# Patient Record
Sex: MALE | Race: WHITE | NOT HISPANIC OR LATINO
[De-identification: names, ages, dates, MRNs, and addresses within clinical notes are randomized per-mention and may not be internally consistent; named-entity substitution may affect disease eponyms.]

---

## 2017-09-11 ENCOUNTER — MEDICATION RENEWAL (OUTPATIENT)
Age: 57
End: 2017-09-11

## 2017-10-16 ENCOUNTER — RX RENEWAL (OUTPATIENT)
Age: 57
End: 2017-10-16

## 2017-11-13 ENCOUNTER — RX RENEWAL (OUTPATIENT)
Age: 57
End: 2017-11-13

## 2017-11-28 ENCOUNTER — APPOINTMENT (OUTPATIENT)
Dept: UROLOGY | Facility: CLINIC | Age: 57
End: 2017-11-28
Payer: COMMERCIAL

## 2017-11-28 VITALS
SYSTOLIC BLOOD PRESSURE: 121 MMHG | HEART RATE: 71 BPM | WEIGHT: 175 LBS | HEIGHT: 69 IN | TEMPERATURE: 98.9 F | BODY MASS INDEX: 25.92 KG/M2 | DIASTOLIC BLOOD PRESSURE: 79 MMHG

## 2017-11-28 DIAGNOSIS — R39.11 HESITANCY OF MICTURITION: ICD-10-CM

## 2017-11-28 PROCEDURE — 99214 OFFICE O/P EST MOD 30 MIN: CPT

## 2017-12-02 LAB — PSA SERPL-MCNC: 3.99 NG/ML

## 2017-12-11 ENCOUNTER — RX RENEWAL (OUTPATIENT)
Age: 57
End: 2017-12-11

## 2017-12-12 ENCOUNTER — RX RENEWAL (OUTPATIENT)
Age: 57
End: 2017-12-12

## 2018-03-23 ENCOUNTER — RX RENEWAL (OUTPATIENT)
Age: 58
End: 2018-03-23

## 2019-03-10 ENCOUNTER — RX RENEWAL (OUTPATIENT)
Age: 59
End: 2019-03-10

## 2019-03-21 ENCOUNTER — APPOINTMENT (OUTPATIENT)
Dept: UROLOGY | Facility: CLINIC | Age: 59
End: 2019-03-21
Payer: COMMERCIAL

## 2019-03-21 VITALS
WEIGHT: 175 LBS | HEIGHT: 69 IN | BODY MASS INDEX: 25.92 KG/M2 | TEMPERATURE: 98.2 F | SYSTOLIC BLOOD PRESSURE: 118 MMHG | HEART RATE: 79 BPM | DIASTOLIC BLOOD PRESSURE: 80 MMHG | RESPIRATION RATE: 17 BRPM

## 2019-03-21 DIAGNOSIS — R35.0 FREQUENCY OF MICTURITION: ICD-10-CM

## 2019-03-21 PROCEDURE — 99214 OFFICE O/P EST MOD 30 MIN: CPT

## 2019-03-23 LAB
ANION GAP SERPL CALC-SCNC: 16 MMOL/L
BUN SERPL-MCNC: 21 MG/DL
CALCIUM SERPL-MCNC: 9.8 MG/DL
CHLORIDE SERPL-SCNC: 103 MMOL/L
CO2 SERPL-SCNC: 24 MMOL/L
CREAT SERPL-MCNC: 1.02 MG/DL
GLUCOSE SERPL-MCNC: 104 MG/DL
POTASSIUM SERPL-SCNC: 4.2 MMOL/L
PSA FREE FLD-MCNC: 25 %
PSA FREE SERPL-MCNC: 0.97 NG/ML
PSA SERPL-MCNC: 3.87 NG/ML
SODIUM SERPL-SCNC: 143 MMOL/L

## 2019-03-23 NOTE — HISTORY OF PRESENT ILLNESS
[FreeTextEntry1] : Viet Calderón returns to the office today. He is a 59-year-old man with a saw in November 2017. In the past he has undergone prostate biopsy related to PSA elevation and this returned all benign prostate tissue. He also had a prior MRI of his prostate in 2014 showing no evidence of any suspicious lesions within the prostate. He has baseline lower urinary tract symptoms as well related to BPH and bladder outlet obstruction. He continues to use tamsulosin 0.8 mg daily. He says that the significance of the urinary symptoms does wax and wane a bit but for the most part he seems to be fairly comfortable and has not had any major progression of the symptoms over the last 1-1/2 years.\par \par He denies any gross hematuria or dysuria. He continues to notice that the stream seems to be a bit weak or stopping and starting at times. He also has mild urinary hesitancy. His nocturia is typically 2-4 times overnight, but mostly toward twice per night. He has not had any problems of urinary retention or urinary tract infections.\par \par The patient does have a family history of prostate cancer with his father being affected with the disease.\par

## 2019-03-23 NOTE — ASSESSMENT
[FreeTextEntry1] : Prostate examination continues to show smooth symmetric prostate without any evidence of focal nodular aorta induration to suggest presence of malignancy. His PSA was rechecked today and found to be 3.87 ng/mL. This is stable compared to last year when it was 3.99 ng/mL at that time. I do not think he needs to consider any additional biopsies of the prostate for now his PSA should continue to be followed about once a year.\par \par The other laboratory evaluation I assessed today was his basic metabolic panel. His electrolytes seem to be normal and his creatinine level is 1.02 mg/dL showing normal renal function.\par \par His tamsulosin prescription was renewed and he will continue this medication for the indefinite future. I will see him in one year.\par

## 2020-12-07 ENCOUNTER — RX RENEWAL (OUTPATIENT)
Age: 60
End: 2020-12-07

## 2020-12-23 ENCOUNTER — APPOINTMENT (OUTPATIENT)
Dept: UROLOGY | Facility: CLINIC | Age: 60
End: 2020-12-23
Payer: COMMERCIAL

## 2020-12-23 VITALS
TEMPERATURE: 97.8 F | HEIGHT: 69 IN | WEIGHT: 178 LBS | HEART RATE: 84 BPM | SYSTOLIC BLOOD PRESSURE: 114 MMHG | DIASTOLIC BLOOD PRESSURE: 68 MMHG | BODY MASS INDEX: 26.36 KG/M2

## 2020-12-23 PROCEDURE — 99072 ADDL SUPL MATRL&STAF TM PHE: CPT

## 2020-12-23 PROCEDURE — 99214 OFFICE O/P EST MOD 30 MIN: CPT

## 2020-12-29 ENCOUNTER — NON-APPOINTMENT (OUTPATIENT)
Age: 60
End: 2020-12-29

## 2020-12-29 LAB
ANION GAP SERPL CALC-SCNC: 10 MMOL/L
BASOPHILS # BLD AUTO: 0.03 K/UL
BASOPHILS NFR BLD AUTO: 0.3 %
BUN SERPL-MCNC: 20 MG/DL
CALCIUM SERPL-MCNC: 9.8 MG/DL
CHLORIDE SERPL-SCNC: 103 MMOL/L
CO2 SERPL-SCNC: 27 MMOL/L
CREAT SERPL-MCNC: 1.08 MG/DL
EOSINOPHIL # BLD AUTO: 0.42 K/UL
EOSINOPHIL NFR BLD AUTO: 4.4 %
GLUCOSE SERPL-MCNC: 127 MG/DL
HCT VFR BLD CALC: 43.6 %
HGB BLD-MCNC: 14 G/DL
IMM GRANULOCYTES NFR BLD AUTO: 0.5 %
LYMPHOCYTES # BLD AUTO: 2.79 K/UL
LYMPHOCYTES NFR BLD AUTO: 29.5 %
MAN DIFF?: NORMAL
MCHC RBC-ENTMCNC: 31.7 PG
MCHC RBC-ENTMCNC: 32.1 GM/DL
MCV RBC AUTO: 98.6 FL
MONOCYTES # BLD AUTO: 0.68 K/UL
MONOCYTES NFR BLD AUTO: 7.2 %
NEUTROPHILS # BLD AUTO: 5.49 K/UL
NEUTROPHILS NFR BLD AUTO: 58.1 %
PLATELET # BLD AUTO: 266 K/UL
POTASSIUM SERPL-SCNC: 4.6 MMOL/L
PSA FREE FLD-MCNC: 26 %
PSA FREE SERPL-MCNC: 1.13 NG/ML
PSA SERPL-MCNC: 4.33 NG/ML
RBC # BLD: 4.42 M/UL
RBC # FLD: 12.6 %
SODIUM SERPL-SCNC: 140 MMOL/L
WBC # FLD AUTO: 9.46 K/UL

## 2020-12-29 NOTE — HISTORY OF PRESENT ILLNESS
[FreeTextEntry1] : Viet Calderón returns to the office today.  He is a 60-year-old man with history of obstructive lower urinary tract symptoms.  He has had intermittency of the urinary stream, some hesitancy, and urinary frequency.  He has been managed with tamsulosin monotherapy since around 2012 with some symptomatic improvement although he now does experience some worsening of the symptoms.  He denies any gross hematuria or dysuria.  He has not been in urinary retention or required catheterization of the bladder.  He denies any flank pain, fever, chill, nausea or vomiting.  He has no other abdominal complaints and denies constipation.\par \par The patient does not have a current primary care physician.  He has not been to any other physician outside of myself by his report over the last few years.  I last saw him in March 2019.  I have followed his PSA over the years and his PSA has been slowly increasing, most likely related to his enlarged prostate.  The patient did have a prostate biopsy performed in 2015 when his PSA had been noted to be elevated.  He also had a prostate MRI at that time.  The prostate MRI did not show any significant lesions.  PI-RADS 2.  His prostate measured 58 cm³ at that time.  His biopsy was all negative.\par \par

## 2020-12-29 NOTE — ASSESSMENT
[FreeTextEntry1] : His PSA was reevaluated today and is now 4.33 ng/mL with 26% free fraction.  This compares with 3.87 from March 2019.  At this point, I do suspect that this PSA increase is again related to his benign prostate enlargement.  His PSA has not been trending up significantly over the last several years.  I do not think he needs to have any additional imaging or biopsy of the prostate at this point.\par \par I would recommend he check his PSA again in about 6 months.  We have decided today to initiate finasteride related to his worsening urinary symptoms and from a symptomatic standpoint and also to help prevent a potential future issue of either urinary retention or requirement of bladder outlet surgery, finasteride was added for these purposes.  His PSA is expected to decline also on finasteride and we will review the PSA in 6 months for that purpose and also review his symptoms again to ensure he is having adequate treatment response.\par \par I also advised him that his blood glucose level on basic metabolic panel collected today is a bit elevated and I would suggest that he obtain follow-up with a primary care physician for additional management.

## 2021-12-28 ENCOUNTER — RX RENEWAL (OUTPATIENT)
Age: 61
End: 2021-12-28

## 2022-01-04 ENCOUNTER — RX RENEWAL (OUTPATIENT)
Age: 62
End: 2022-01-04

## 2022-01-28 ENCOUNTER — RX RENEWAL (OUTPATIENT)
Age: 62
End: 2022-01-28

## 2022-02-07 ENCOUNTER — APPOINTMENT (OUTPATIENT)
Dept: UROLOGY | Facility: CLINIC | Age: 62
End: 2022-02-07
Payer: COMMERCIAL

## 2022-02-07 VITALS
HEIGHT: 69 IN | RESPIRATION RATE: 17 BRPM | DIASTOLIC BLOOD PRESSURE: 77 MMHG | BODY MASS INDEX: 26.36 KG/M2 | SYSTOLIC BLOOD PRESSURE: 155 MMHG | WEIGHT: 178 LBS | HEART RATE: 76 BPM

## 2022-02-07 DIAGNOSIS — B02.23 POSTHERPETIC POLYNEUROPATHY: ICD-10-CM

## 2022-02-07 PROCEDURE — 99214 OFFICE O/P EST MOD 30 MIN: CPT | Mod: 25

## 2022-02-07 PROCEDURE — 51798 US URINE CAPACITY MEASURE: CPT

## 2022-02-07 RX ORDER — FINASTERIDE 5 MG/1
5 TABLET, FILM COATED ORAL
Qty: 90 | Refills: 3 | Status: COMPLETED | COMMUNITY
Start: 2020-12-23 | End: 2022-02-07

## 2022-02-07 RX ORDER — GABAPENTIN 300 MG/1
300 CAPSULE ORAL
Qty: 60 | Refills: 0 | Status: DISCONTINUED | COMMUNITY
Start: 2021-08-16

## 2022-02-07 RX ORDER — FAMCICLOVIR 500 MG/1
500 TABLET, FILM COATED ORAL
Qty: 20 | Refills: 0 | Status: DISCONTINUED | COMMUNITY
Start: 2021-08-13

## 2022-02-07 NOTE — HISTORY OF PRESENT ILLNESS
[FreeTextEntry1] : Viet Calderón returns to the office today.  He is a 61-year-old male with history of BPH and lower urinary tract symptoms.  He is on tamsulosin 0.4 mg which he takes twice daily.  He notes that he is quite dependent on this and if he misses a dose he has difficulty urinating and has been in near retention at times.  I have previously prescribed him finasteride to use for additional prophylaxis against retention but he did not pick it up in the past and decided not to use it.  He notes that his stream seems to be reasonable when he is on medication.  He has notable frequency and occasional urgency but no incontinence.  He has nocturia x1.  He denies hematuria or dysuria and there have been no additional episodes of retention or infection.\par

## 2022-02-07 NOTE — ASSESSMENT
[FreeTextEntry1] : We reviewed management of his BPH.  His residual bladder scan today shows he is retaining urine, 220 mL.  I would advise him again that finasteride here may be of help to him and preventing episodes of either retention or requiring surgery for relief of bladder outlet obstruction.  I explained to him how the combination therapy is with alpha blockers and 5 alpha reductase inhibitors as evidence behind using them for preventative purposes.  I gave him a new prescription for finasteride and he states that he will take it with this in mind.  I did review proper use and possible side effects that may be experienced.\par \par His PSA will be rechecked today as part of ongoing prostate cancer screening.  I will be in touch with him with that result once available for review and if there is any need to consider additional work-up for PSA elevation, we may consider prostate imaging with MRI.

## 2022-02-12 LAB
PSA FREE FLD-MCNC: 24 %
PSA FREE SERPL-MCNC: 1.24 NG/ML
PSA SERPL-MCNC: 5.27 NG/ML

## 2022-02-26 ENCOUNTER — EMERGENCY (EMERGENCY)
Facility: HOSPITAL | Age: 62
LOS: 0 days | Discharge: HOME | End: 2022-02-26
Attending: STUDENT IN AN ORGANIZED HEALTH CARE EDUCATION/TRAINING PROGRAM
Payer: COMMERCIAL

## 2022-02-26 ENCOUNTER — LABORATORY RESULT (OUTPATIENT)
Age: 62
End: 2022-02-26

## 2022-02-26 VITALS
WEIGHT: 169.98 LBS | DIASTOLIC BLOOD PRESSURE: 63 MMHG | HEART RATE: 65 BPM | RESPIRATION RATE: 18 BRPM | SYSTOLIC BLOOD PRESSURE: 104 MMHG

## 2022-02-26 VITALS
TEMPERATURE: 99 F | OXYGEN SATURATION: 100 % | HEART RATE: 57 BPM | SYSTOLIC BLOOD PRESSURE: 107 MMHG | DIASTOLIC BLOOD PRESSURE: 65 MMHG | RESPIRATION RATE: 15 BRPM
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
## 2022-02-26 DIAGNOSIS — R07.81 PLEURODYNIA: ICD-10-CM

## 2022-02-26 DIAGNOSIS — S00.01XA ABRASION OF SCALP, INITIAL ENCOUNTER: ICD-10-CM

## 2022-02-26 DIAGNOSIS — S09.90XA UNSPECIFIED INJURY OF HEAD, INITIAL ENCOUNTER: ICD-10-CM

## 2022-02-26 DIAGNOSIS — R55 SYNCOPE AND COLLAPSE: ICD-10-CM

## 2022-02-26 DIAGNOSIS — Z23 ENCOUNTER FOR IMMUNIZATION: ICD-10-CM
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
## 2022-02-26 DIAGNOSIS — Z87.438 PERSONAL HISTORY OF OTHER DISEASES OF MALE GENITAL ORGANS: ICD-10-CM

## 2022-02-26 LAB
ALBUMIN SERPL ELPH-MCNC: 3.1 G/DL — LOW (ref 3.3–5)
ALP SERPL-CCNC: 86 U/L — SIGNIFICANT CHANGE UP (ref 40–120)
ALT FLD-CCNC: 16 U/L — SIGNIFICANT CHANGE UP (ref 12–78)
ANION GAP SERPL CALC-SCNC: 6 MMOL/L — SIGNIFICANT CHANGE UP (ref 5–17)
APPEARANCE UR: CLEAR — SIGNIFICANT CHANGE UP
APTT BLD: 26.5 SEC — LOW (ref 27.5–35.5)
AST SERPL-CCNC: 10 U/L — LOW (ref 15–37)
BASOPHILS # BLD AUTO: 0.03 K/UL — SIGNIFICANT CHANGE UP (ref 0–0.2)
BASOPHILS NFR BLD AUTO: 0.3 % — SIGNIFICANT CHANGE UP (ref 0–2)
BILIRUB SERPL-MCNC: 0.6 MG/DL — SIGNIFICANT CHANGE UP (ref 0.2–1.2)
BILIRUB UR-MCNC: NEGATIVE — SIGNIFICANT CHANGE UP
BUN SERPL-MCNC: 17 MG/DL — SIGNIFICANT CHANGE UP (ref 7–23)
CALCIUM SERPL-MCNC: 8.4 MG/DL — LOW (ref 8.5–10.1)
CHLORIDE SERPL-SCNC: 108 MMOL/L — SIGNIFICANT CHANGE UP (ref 96–108)
CK SERPL-CCNC: 103 U/L — SIGNIFICANT CHANGE UP (ref 26–308)
CO2 SERPL-SCNC: 25 MMOL/L — SIGNIFICANT CHANGE UP (ref 22–31)
COLOR SPEC: YELLOW — SIGNIFICANT CHANGE UP
CREAT SERPL-MCNC: 1 MG/DL — SIGNIFICANT CHANGE UP (ref 0.5–1.3)
DIFF PNL FLD: ABNORMAL
EOSINOPHIL # BLD AUTO: 0.42 K/UL — SIGNIFICANT CHANGE UP (ref 0–0.5)
EOSINOPHIL NFR BLD AUTO: 4 % — SIGNIFICANT CHANGE UP (ref 0–6)
GLUCOSE SERPL-MCNC: 144 MG/DL — HIGH (ref 70–99)
GLUCOSE UR QL: NEGATIVE — SIGNIFICANT CHANGE UP
HCT VFR BLD CALC: 38.6 % — LOW (ref 39–50)
HGB BLD-MCNC: 12.9 G/DL — LOW (ref 13–17)
IMM GRANULOCYTES NFR BLD AUTO: 0.4 % — SIGNIFICANT CHANGE UP (ref 0–1.5)
INR BLD: 0.97 RATIO — SIGNIFICANT CHANGE UP (ref 0.88–1.16)
KETONES UR-MCNC: NEGATIVE — SIGNIFICANT CHANGE UP
LEUKOCYTE ESTERASE UR-ACNC: NEGATIVE — SIGNIFICANT CHANGE UP
LYMPHOCYTES # BLD AUTO: 2.26 K/UL — SIGNIFICANT CHANGE UP (ref 1–3.3)
LYMPHOCYTES # BLD AUTO: 21.7 % — SIGNIFICANT CHANGE UP (ref 13–44)
MAGNESIUM SERPL-MCNC: 2.2 MG/DL — SIGNIFICANT CHANGE UP (ref 1.6–2.6)
MCHC RBC-ENTMCNC: 32.4 PG — SIGNIFICANT CHANGE UP (ref 27–34)
MCHC RBC-ENTMCNC: 33.4 GM/DL — SIGNIFICANT CHANGE UP (ref 32–36)
MCV RBC AUTO: 97 FL — SIGNIFICANT CHANGE UP (ref 80–100)
MONOCYTES # BLD AUTO: 0.71 K/UL — SIGNIFICANT CHANGE UP (ref 0–0.9)
MONOCYTES NFR BLD AUTO: 6.8 % — SIGNIFICANT CHANGE UP (ref 2–14)
NEUTROPHILS # BLD AUTO: 6.95 K/UL — SIGNIFICANT CHANGE UP (ref 1.8–7.4)
NEUTROPHILS NFR BLD AUTO: 66.8 % — SIGNIFICANT CHANGE UP (ref 43–77)
NITRITE UR-MCNC: NEGATIVE — SIGNIFICANT CHANGE UP
PH UR: 5 — SIGNIFICANT CHANGE UP (ref 5–8)
PLATELET # BLD AUTO: 252 K/UL — SIGNIFICANT CHANGE UP (ref 150–400)
POTASSIUM SERPL-MCNC: 4 MMOL/L — SIGNIFICANT CHANGE UP (ref 3.5–5.3)
POTASSIUM SERPL-SCNC: 4 MMOL/L — SIGNIFICANT CHANGE UP (ref 3.5–5.3)
PROT SERPL-MCNC: 6.4 GM/DL — SIGNIFICANT CHANGE UP (ref 6–8.3)
PROT UR-MCNC: NEGATIVE — SIGNIFICANT CHANGE UP
PROTHROM AB SERPL-ACNC: 11.3 SEC — SIGNIFICANT CHANGE UP (ref 10.5–13.4)
RBC # BLD: 3.98 M/UL — LOW (ref 4.2–5.8)
RBC # FLD: 12.7 % — SIGNIFICANT CHANGE UP (ref 10.3–14.5)
SODIUM SERPL-SCNC: 139 MMOL/L — SIGNIFICANT CHANGE UP (ref 135–145)
SP GR SPEC: 1 — LOW (ref 1.01–1.02)
TROPONIN I, HIGH SENSITIVITY RESULT: 5.91 NG/L — SIGNIFICANT CHANGE UP
TROPONIN I, HIGH SENSITIVITY RESULT: 5.97 NG/L — SIGNIFICANT CHANGE UP
TSH SERPL-MCNC: 2.03 UU/ML — SIGNIFICANT CHANGE UP (ref 0.34–4.82)
UROBILINOGEN FLD QL: NEGATIVE — SIGNIFICANT CHANGE UP
WBC # BLD: 10.41 K/UL — SIGNIFICANT CHANGE UP (ref 3.8–10.5)
WBC # FLD AUTO: 10.41 K/UL — SIGNIFICANT CHANGE UP (ref 3.8–10.5)

## 2022-02-26 PROCEDURE — 72125 CT NECK SPINE W/O DYE: CPT | Mod: 26,MA

## 2022-02-26 PROCEDURE — 71260 CT THORAX DX C+: CPT | Mod: MA

## 2022-02-26 PROCEDURE — 36415 COLL VENOUS BLD VENIPUNCTURE: CPT

## 2022-02-26 PROCEDURE — 84484 ASSAY OF TROPONIN QUANT: CPT

## 2022-02-26 PROCEDURE — 80053 COMPREHEN METABOLIC PANEL: CPT

## 2022-02-26 PROCEDURE — 85730 THROMBOPLASTIN TIME PARTIAL: CPT

## 2022-02-26 PROCEDURE — 99285 EMERGENCY DEPT VISIT HI MDM: CPT

## 2022-02-26 PROCEDURE — 87086 URINE CULTURE/COLONY COUNT: CPT

## 2022-02-26 PROCEDURE — 99285 EMERGENCY DEPT VISIT HI MDM: CPT | Mod: 25

## 2022-02-26 PROCEDURE — 82550 ASSAY OF CK (CPK): CPT

## 2022-02-26 PROCEDURE — 72125 CT NECK SPINE W/O DYE: CPT | Mod: MA

## 2022-02-26 PROCEDURE — 90715 TDAP VACCINE 7 YRS/> IM: CPT

## 2022-02-26 PROCEDURE — 90471 IMMUNIZATION ADMIN: CPT

## 2022-02-26 PROCEDURE — 86900 BLOOD TYPING SEROLOGIC ABO: CPT

## 2022-02-26 PROCEDURE — 85025 COMPLETE CBC W/AUTO DIFF WBC: CPT

## 2022-02-26 PROCEDURE — 86850 RBC ANTIBODY SCREEN: CPT

## 2022-02-26 PROCEDURE — 81001 URINALYSIS AUTO W/SCOPE: CPT

## 2022-02-26 PROCEDURE — 83735 ASSAY OF MAGNESIUM: CPT

## 2022-02-26 PROCEDURE — 84443 ASSAY THYROID STIM HORMONE: CPT

## 2022-02-26 PROCEDURE — 82962 GLUCOSE BLOOD TEST: CPT

## 2022-02-26 PROCEDURE — 70450 CT HEAD/BRAIN W/O DYE: CPT | Mod: 26,MA

## 2022-02-26 PROCEDURE — 74177 CT ABD & PELVIS W/CONTRAST: CPT | Mod: 26,MA

## 2022-02-26 PROCEDURE — 70450 CT HEAD/BRAIN W/O DYE: CPT | Mod: MA

## 2022-02-26 PROCEDURE — 86901 BLOOD TYPING SEROLOGIC RH(D): CPT

## 2022-02-26 PROCEDURE — 85610 PROTHROMBIN TIME: CPT

## 2022-02-26 PROCEDURE — 93010 ELECTROCARDIOGRAM REPORT: CPT

## 2022-02-26 PROCEDURE — 93005 ELECTROCARDIOGRAM TRACING: CPT

## 2022-02-26 PROCEDURE — 74177 CT ABD & PELVIS W/CONTRAST: CPT | Mod: MA

## 2022-02-26 PROCEDURE — 71260 CT THORAX DX C+: CPT | Mod: 26,MA

## 2022-02-26 RX ORDER — SODIUM CHLORIDE 9 MG/ML
1000 INJECTION INTRAMUSCULAR; INTRAVENOUS; SUBCUTANEOUS ONCE
Refills: 0 | Status: COMPLETED | OUTPATIENT
Start: 2022-02-26 | End: 2022-02-26

## 2022-02-26 RX ORDER — ACETAMINOPHEN 500 MG
650 TABLET ORAL ONCE
Refills: 0 | Status: COMPLETED | OUTPATIENT
Start: 2022-02-26 | End: 2022-02-26

## 2022-02-26 RX ORDER — TETANUS TOXOID, REDUCED DIPHTHERIA TOXOID AND ACELLULAR PERTUSSIS VACCINE, ADSORBED 5; 2.5; 8; 8; 2.5 [IU]/.5ML; [IU]/.5ML; UG/.5ML; UG/.5ML; UG/.5ML
0.5 SUSPENSION INTRAMUSCULAR ONCE
Refills: 0 | Status: COMPLETED | OUTPATIENT
Start: 2022-02-26 | End: 2022-02-26

## 2022-02-26 RX ADMIN — Medication 650 MILLIGRAM(S): at 08:06

## 2022-02-26 RX ADMIN — TETANUS TOXOID, REDUCED DIPHTHERIA TOXOID AND ACELLULAR PERTUSSIS VACCINE, ADSORBED 0.5 MILLILITER(S): 5; 2.5; 8; 8; 2.5 SUSPENSION INTRAMUSCULAR at 08:06

## 2022-02-26 RX ADMIN — SODIUM CHLORIDE 1000 MILLILITER(S): 9 INJECTION INTRAMUSCULAR; INTRAVENOUS; SUBCUTANEOUS at 08:08

## 2022-02-26 NOTE — ED PROVIDER NOTE - CLINICAL SUMMARY MEDICAL DECISION MAKING FREE TEXT BOX
62 yo male s/p fall, syncope; no prodromal symptoms; hx of bph- on flomax; recently added finasteride; ct imaging r/o traumatic injury; ekg/cardiac monitor; labs; re-eval

## 2022-02-26 NOTE — ED ADULT NURSE REASSESSMENT NOTE - NS ED NURSE REASSESS COMMENT FT1
ambulation trial done, pt ambulated well w/o difficulty.  denies dizziness, weakness, chest pain or palpitations.  md brito made aware

## 2022-02-26 NOTE — ED PROVIDER NOTE - CARE PROVIDER_API CALL
Edwin Gonzalez (DO)  Cardiology  [address] [place] [address]  Phone: [phone]  Fax: [phone]  Follow Up Time:

## 2022-02-26 NOTE — ED PROVIDER NOTE - NSFOLLOWUPINSTRUCTIONS_ED_ALL_ED_FT
23-Aug-2018 03:57 23-Aug-2018 03:59 Syncope    WHAT YOU NEED TO KNOW:    Syncope is also called fainting or passing out. Syncope is a sudden, temporary loss of consciousness, followed by a fall from a standing or sitting position. Syncope ranges from not serious to a sign of a more serious condition that needs to be treated. You can control some health conditions that cause syncope. Your healthcare providers can help you create a plan to manage syncope and prevent episodes.    DISCHARGE INSTRUCTIONS:    Seek care immediately if:     You are bleeding because you hit your head when you fainted.       You suddenly have double vision, difficulty speaking, numbness, and cannot move your arms or legs.      You have chest pain and trouble breathing.      You vomit blood or material that looks like coffee grounds.      You see blood in your bowel movement.    Contact your healthcare provider if:     You have new or worsening symptoms.      You have another syncope episode.      You have a headache, fast heartbeat, or feel too dizzy to stand up.      You have questions or concerns about your condition or care.    Medicines:     Medicines may be needed to help your heart pump strongly and regularly. Your healthcare provider may also make changes to any medicines that are causing syncope.       Take your medicine as directed. Contact your healthcare provider if you think your medicine is not helping or if you have side effects. Tell him or her if you are allergic to any medicine. Keep a list of the medicines, vitamins, and herbs you take. Include the amounts, and when and why you take them. Bring the list or the pill bottles to follow-up visits. Carry your medicine list with you in case of an emergency.    Follow up with your healthcare provider as directed: Write down your questions so you remember to ask them during your visits.     Manage syncope:     Keep a record of your syncope episodes. Include your symptoms and your activity before and after the episode. The record can help your healthcare provider find the cause of your syncope and help you manage episodes.      Sit or lie down when needed. This includes when you feel dizzy, your throat is getting tight, and your vision changes. Raise your legs above the level of your heart.      Take slow, deep breaths if you start to breathe faster with anxiety or fear. This can help decrease dizziness and the feeling that you might faint.       Check your blood pressure often. This is important if you take medicine to lower your blood pressure. Check your blood pressure when you are lying down and when you are standing. Ask how often to check during the day. Keep a record of your blood pressure numbers. Your healthcare provider may use the record to help plan your treatment.How to take a Blood Pressure         Prevent a syncope episode:     Move slowly and let yourself get used to one position before you move to another position. This is very important when you change from a lying or sitting position to a standing position. Take some deep breaths before you stand up from a lying position. Stand up slowly. Sudden movements may cause a fainting spell. Sit on the side of the bed or couch for a few minutes before you stand up. If you are on bedrest, try to be upright for about 2 hours each day, or as directed. Do not lock your legs if you are standing for a long period of time. Move your legs and bend your knees to keep blood flowing.      Follow your healthcare provider's recommendations. Your provider may recommend that you drink more liquids to prevent dehydration. You may also need to have more salt to keep your blood pressure from dropping too low and causing syncope. Your provider will tell you how much liquid and sodium to have each day. He or she will also tell you how much physical activity is safe for you. This will depend on what is causing your syncope.      Watch for signs of low blood sugar. These include hunger, nervousness, sweating, and fast or fluttery heartbeats. Talk with your healthcare provider about ways to keep your blood sugar level steady.      Do not strain if you are constipated. You may faint if you strain to have a bowel movement. Walking is the best way to get your bowels moving. Eat foods high in fiber to make it easier to have a bowel movement. Good examples are high-fiber cereals, beans, vegetables, and whole-grain breads. Prune juice may help make bowel movements softer.      Be careful in hot weather. Heat can cause a syncope episode. Limit activity done outside on hot days. Physical activity in hot weather can lead to dehydration. This can cause an episode.

## 2022-02-26 NOTE — ED PROVIDER NOTE - MUSCULOSKELETAL, MLM
Spine appears normal, range of motion is not limited, (+) right sided posterior/lateral rib tenderness;

## 2022-02-26 NOTE — ED PROVIDER NOTE - OBJECTIVE STATEMENT
Patient is a 60 yo male with hx of BPH on flomax and finasteride with syncopal episode in bathroom this AM; patient awoke multiple times during the night to urinate which is not atypical for patient; patient awoke again this AM and passed out in bathroom; no prodromal symptoms; no dizziness; no lightheadedness; no chest pain; no sob prior to syncope/fall; patient is unsure if he had finished urinating prior to passing out; patient struck his head on ground as well as complaining of right sided rib pain; no blood thinners; no hx of syncope in past; no known cardiac disease. Tdap is not uptodate.

## 2022-02-26 NOTE — ED ADULT TRIAGE NOTE - CHIEF COMPLAINT QUOTE
pt presents to ED due to syncopal episode in the bathroom pt hit head small amount of blood noted to back of head denies blood thinners

## 2022-02-26 NOTE — ED PROVIDER NOTE - PROGRESS NOTE DETAILS
Letty DO: left posterior scalp abrasion copiously cleared; no laceration to repair at this time. Letty DO: patient offered admission at this time in setting of syncopal episode but wanting to go home; instructed to f/u with cardiology- given name and private urologist in 1-2 days without fail; all diagnostic results discussed with patient at bedside and copies provided; strict return precautions given.

## 2022-02-26 NOTE — ED PROVIDER NOTE - SKIN, MLM
Skin normal color for race, warm, dry; (+) 1cm superficial abrasion to left posterior scalp; (+) bruising to right posterior mid back;

## 2022-02-26 NOTE — ED ADULT NURSE NOTE - NSIMPLEMENTINTERV_GEN_ALL_ED
Implemented All Fall Risk Interventions:  [place] to call system. Call bell, personal items and telephone within reach. Instruct patient to call for assistance. Room bathroom lighting operational. Non-slip footwear when patient is off stretcher. Physically safe environment: no spills, clutter or unnecessary equipment. Stretcher in lowest position, wheels locked, appropriate side rails in place. Provide visual cue, wrist band, yellow gown, etc. Monitor gait and stability. Monitor for mental status changes and reorient to person, place, and time. Review medications for side effects contributing to fall risk. Reinforce activity limits and safety measures with patient and family.

## 2022-02-26 NOTE — ED ADULT NURSE NOTE - OBJECTIVE STATEMENT
Patient states he got up to go to the bathroom and then just remembers being on the floor with his head bleeding. Patient has a laceration back of head

## 2022-02-26 NOTE — ED PROVIDER NOTE - PATIENT PORTAL LINK FT
You can access the FollowMyHealth Patient Portal offered by [facility] by registering at the following website: [email or web address]. By joining [facility]’s FollowMyHealth portal, you will also be able to view your health information using other applications (apps) compatible with our system.

## 2022-02-28 LAB
CULTURE RESULTS: SIGNIFICANT CHANGE UP
SPECIMEN SOURCE: SIGNIFICANT CHANGE UP

## 2022-03-08 ENCOUNTER — APPOINTMENT (OUTPATIENT)
Dept: MRI IMAGING | Facility: CLINIC | Age: 62
End: 2022-03-08
Payer: COMMERCIAL

## 2022-03-08 ENCOUNTER — RESULT REVIEW (OUTPATIENT)
Age: 62
End: 2022-03-08

## 2022-03-08 ENCOUNTER — OUTPATIENT (OUTPATIENT)
Dept: OUTPATIENT SERVICES | Facility: HOSPITAL | Age: 62
LOS: 1 days | End: 2022-03-08
Payer: COMMERCIAL

## 2022-03-08 DIAGNOSIS — R97.20 ELEVATED PROSTATE SPECIFIC ANTIGEN [PSA]: ICD-10-CM

## 2022-03-08 PROBLEM — Z87.438 PERSONAL HISTORY OF OTHER DISEASES OF MALE GENITAL ORGANS: Chronic | Status: ACTIVE | Noted: 2022-02-26

## 2022-03-08 PROCEDURE — 76377 3D RENDER W/INTRP POSTPROCES: CPT | Mod: 26

## 2022-03-08 PROCEDURE — 72197 MRI PELVIS W/O & W/DYE: CPT

## 2022-03-08 PROCEDURE — 72197 MRI PELVIS W/O & W/DYE: CPT | Mod: 26

## 2022-03-08 PROCEDURE — A9585: CPT

## 2022-03-08 PROCEDURE — 76377 3D RENDER W/INTRP POSTPROCES: CPT

## 2022-03-11 ENCOUNTER — NON-APPOINTMENT (OUTPATIENT)
Age: 62
End: 2022-03-11

## 2022-04-14 ENCOUNTER — NON-APPOINTMENT (OUTPATIENT)
Age: 62
End: 2022-04-14

## 2022-04-18 ENCOUNTER — EMERGENCY (EMERGENCY)
Facility: HOSPITAL | Age: 62
LOS: 0 days | Discharge: HOME | End: 2022-04-18
Attending: EMERGENCY MEDICINE
Payer: COMMERCIAL

## 2022-04-18 VITALS
DIASTOLIC BLOOD PRESSURE: 75 MMHG | HEART RATE: 87 BPM | RESPIRATION RATE: 16 BRPM | SYSTOLIC BLOOD PRESSURE: 131 MMHG | TEMPERATURE: 98 F | OXYGEN SATURATION: 100 %

## 2022-04-18 DIAGNOSIS — N40.0 BENIGN PROSTATIC HYPERPLASIA WITHOUT LOWER URINARY TRACT SYMPTOMS: ICD-10-CM

## 2022-04-18 DIAGNOSIS — F07.81 POSTCONCUSSIONAL SYNDROME: ICD-10-CM

## 2022-04-18 DIAGNOSIS — R11.0 NAUSEA: ICD-10-CM

## 2022-04-18 DIAGNOSIS — R51.9 HEADACHE, UNSPECIFIED: ICD-10-CM

## 2022-04-18 DIAGNOSIS — M70.21 OLECRANON BURSITIS, RIGHT ELBOW: ICD-10-CM

## 2022-04-18 PROCEDURE — 99282 EMERGENCY DEPT VISIT SF MDM: CPT

## 2022-04-18 PROCEDURE — 99283 EMERGENCY DEPT VISIT LOW MDM: CPT

## 2022-04-18 NOTE — ED STATDOCS - PROGRESS NOTE DETAILS
signed Morelia Otoole PA-C Pt seen initially in intake by Dr. Mack   62M here with post concussion symptoms since head injury in Feb, when pt had negative CT head. Will arrange for f/u with concussion clinic. Gross neuro intact in ED. signed Morelia Otoole PA-C   Pt now has appt with Dr Malik's NP on 4/21. Pt also asked about swelling on his right elbow from when he fell. Pt with small olecranon bursitis, no apparent infection/inflammation. Will set up ortho appt. Pt agrees with plan of  care. signed Morelia Otoole PA-C   Pt has appt with Dr Schreiber 5/4. Pt feeling well, pt and family agree with DC and plan of care.

## 2022-04-18 NOTE — ED STATDOCS - OBJECTIVE STATEMENT
63 y/o male with a PMHx of BPH on Flomax and Finasteride presents to the ED for evaluation. Pt reports he fell and hit his head at the end of February. Pt was seen in ED at that time, had workup and was d/c. Pt states he has been nauseous, having HAs and feeling off balance since. No other complaints at this time.

## 2022-04-18 NOTE — ED STATDOCS - CARE PROVIDERS DIRECT ADDRESSES
[email or web address]

## 2022-04-18 NOTE — ED STATDOCS - CARE PROVIDER_API CALL
Adriano Malik; PhD)  Neurosurgery  [address], [address]nd [place] [address]  Phone: [phone]  Fax: [phone]  Follow Up Time:    Adriano Malik; PhD)  Neurosurgery  [address], [address]nd [place] [address]  Phone: [phone]  Fax: [phone]  Follow Up Time:     Julianne Schreiber)  [facility]  [address] [place] [address]  Phone: [phone]  Fax: [phone]  Follow Up Time:

## 2022-04-18 NOTE — ED STATDOCS - CARE PLAN
Principal Discharge DX:	Post concussion syndrome   1 Principal Discharge DX:	Post concussion syndrome  Secondary Diagnosis:	Olecranon bursitis, right elbow

## 2022-04-18 NOTE — ED STATDOCS - PATIENT PORTAL LINK FT
You can access the FollowMyHealth Patient Portal offered by [facility] by registering at the following website: [email or web address]. By joining [employer]’s FollowMyHealth portal, you will also be able to view your health information using other applications (apps) compatible with our system.

## 2022-04-18 NOTE — ED STATDOCS - NSFOLLOWUPINSTRUCTIONS_ED_ALL_ED_FT
Post Concussion Syndrome    WHAT YOU NEED TO KNOW:    What is post-concussion syndrome (PCS)? PCS is a group of symptoms that affect your nerves, thinking, and behavior. PCS develops shortly after a concussion and can last for weeks to months.    What increases my risk for PCS?   •Older age      •A substance abuse problem, such as drugs or alcohol      •A past head injury      •A current mood or anxiety disorder      •Poor physical health before your concussion      What are the signs and symptoms of PCS?   •Headaches or vision problems      •Dizziness or poor balance      •Forgetfulness or problems concentrating      •Problems with sleep      •Changes in your personality      •Seizures      •Depression or anxiety      How is PCS diagnosed? Your healthcare provider will ask about your injury. Tell the provider when it happened, what hit you, and the force of the blow. You, or someone close to you, should tell your provider about any confusion you have or changes in your behavior. You may need any of the following:   •A neurologic exam is used to test your memory and your ability to recognize familiar things. It will also show healthcare providers your eye, verbal, and muscle responses.      •Blood and urine tests will be done to make sure there is no other cause for your symptoms. Infections and chemicals, such as alcohol, can affect your memory and behavior.      •CT scan pictures of your head may show an injury. You may be given contrast liquid to help healthcare providers see the pictures better. Tell the healthcare provider if you have ever had an allergic reaction to contrast liquid.      How is PCS treated? Treatment of PCS will focus on your symptoms:   •Acetaminophen decreases pain and fever. It is available without a doctor's order. Ask how much to take and how often to take it. Follow directions. Read the labels of all other medicines you are using to see if they also contain acetaminophen, or ask your doctor or pharmacist. Acetaminophen can cause liver damage if not taken correctly. Do not use more than 4 grams (4,000 milligrams) total of acetaminophen in one day.       •NSAIDs help decrease swelling and pain or fever. This medicine is available with or without a doctor's order. NSAIDs can cause stomach bleeding or kidney problems in certain people. If you take blood thinner medicine, always ask your healthcare provider if NSAIDs are safe for you. Always read the medicine label and follow directions.      •Antidepressants may be given for depression or sleep problems.      •Migraine medicines may be given for migraine headaches.      What are the risks of PCS? PCS may decrease your ability to function at home, school, or work. You may develop persistent post-concussion syndrome. You may develop second-impact syndrome if you have another concussion before you have recovered from the first. Second-impact syndrome can be life-threatening.    How can I prevent PCS?   •Make your home safe. Home safety measures can help prevent head injuries that could lead to a concussion. Install handrails for every staircase. Put soft bumpers on furniture edges and corners. Secure furniture, such as dressers and book cases so they do not fall over.      •Always wear a seatbelt in the car. This helps decrease your risk for a head injury if you are in a car accident.      •Wear protective sports equipment that fits properly. Helmets help decrease your risk for a serious brain injury. Talk to your healthcare provider about other ways that you can decrease your risk for a concussion if you play sports. Ask for more information about sports concussions.      What can I do to manage my symptoms?   •Rest from physical and mental activities as directed. Mental activities need you to think, concentrate, and pay attention. Rest will help you recover from your concussion. Ask your healthcare provider when you can return to school and other daily activities.      •Go to therapy as directed. A cognitive behavioral therapist teaches you skills to help with any thinking and behavior problems you may have. An occupational therapist teaches your skills to help with daily activities.      •Do not participate in sports or physical activities until your healthcare provider says it is okay.These activities could make your symptoms worse or lead to another concussion. Your healthcare provider will tell you when it is okay to return to sports or physical activities.      Where can I find more information?   •[employer]  [address] [place], [place] [id number]  Phone: [phone]  Phone: [phone]  Web Address: http://[email or web address])        Have someone call 911 for any of the following:   •You have a seizure.      •You have trouble breathing.      •You are not responding or you cannot be woken.      When should I seek immediate care?   •You have a sudden headache that seems different or much worse than your usual headaches.      •You cannot stop vomiting.      •You have sudden changes in your vision.      When should I contact my healthcare provider?   •You have nausea or are vomiting.      •You have trouble concentrating.      •You have difficulty speaking or thinking.      •Your symptoms get worse.      •You have questions or concerns about your condition or care.      CARE AGREEMENT:    You have the right to help plan your care. Learn about your health condition and how it may be treated. Discuss treatment options with your healthcare providers to decide what care you want to receive. You always have the right to refuse treatment.        © Copyright [employer] 2022 FOLLOW UP WITH DR MAGANA'S NURSE PRACTITIONER APT YOUR APPOINTMENT ON THURSDAY. RETURN TO ER FOR ANY WORSENING SYMPTOMS OR NEW CONCERNS.     Post Concussion Syndrome    WHAT YOU NEED TO KNOW:    What is post-concussion syndrome (PCS)? PCS is a group of symptoms that affect your nerves, thinking, and behavior. PCS develops shortly after a concussion and can last for weeks to months.    What increases my risk for PCS?   •Older age      •A substance abuse problem, such as drugs or alcohol      •A past head injury      •A current mood or anxiety disorder      •Poor physical health before your concussion      What are the signs and symptoms of PCS?   •Headaches or vision problems      •Dizziness or poor balance      •Forgetfulness or problems concentrating      •Problems with sleep      •Changes in your personality      •Seizures      •Depression or anxiety      How is PCS diagnosed? Your healthcare provider will ask about your injury. Tell the provider when it happened, what hit you, and the force of the blow. You, or someone close to you, should tell your provider about any confusion you have or changes in your behavior. You may need any of the following:   •A neurologic exam is used to test your memory and your ability to recognize familiar things. It will also show healthcare providers your eye, verbal, and muscle responses.      •Blood and urine tests will be done to make sure there is no other cause for your symptoms. Infections and chemicals, such as alcohol, can affect your memory and behavior.      •CT scan pictures of your head may show an injury. You may be given contrast liquid to help healthcare providers see the pictures better. Tell the healthcare provider if you have ever had an allergic reaction to contrast liquid.      How is PCS treated? Treatment of PCS will focus on your symptoms:   •Acetaminophen decreases pain and fever. It is available without a doctor's order. Ask how much to take and how often to take it. Follow directions. Read the labels of all other medicines you are using to see if they also contain acetaminophen, or ask your doctor or pharmacist. Acetaminophen can cause liver damage if not taken correctly. Do not use more than 4 grams (4,000 milligrams) total of acetaminophen in one day.       •NSAIDs help decrease swelling and pain or fever. This medicine is available with or without a doctor's order. NSAIDs can cause stomach bleeding or kidney problems in certain people. If you take blood thinner medicine, always ask your healthcare provider if NSAIDs are safe for you. Always read the medicine label and follow directions.      •Antidepressants may be given for depression or sleep problems.      •Migraine medicines may be given for migraine headaches.      What are the risks of PCS? PCS may decrease your ability to function at home, school, or work. You may develop persistent post-concussion syndrome. You may develop second-impact syndrome if you have another concussion before you have recovered from the first. Second-impact syndrome can be life-threatening.    How can I prevent PCS?   •Make your home safe. Home safety measures can help prevent head injuries that could lead to a concussion. Install handrails for every staircase. Put soft bumpers on furniture edges and corners. Secure furniture, such as dressers and book cases so they do not fall over.      •Always wear a seatbelt in the car. This helps decrease your risk for a head injury if you are in a car accident.      •Wear protective sports equipment that fits properly. Helmets help decrease your risk for a serious brain injury. Talk to your healthcare provider about other ways that you can decrease your risk for a concussion if you play sports. Ask for more information about sports concussions.      What can I do to manage my symptoms?   •Rest from physical and mental activities as directed. Mental activities need you to think, concentrate, and pay attention. Rest will help you recover from your concussion. Ask your healthcare provider when you can return to school and other daily activities.      •Go to therapy as directed. A cognitive behavioral therapist teaches you skills to help with any thinking and behavior problems you may have. An occupational therapist teaches your skills to help with daily activities.      •Do not participate in sports or physical activities until your healthcare provider says it is okay.These activities could make your symptoms worse or lead to another concussion. Your healthcare provider will tell you when it is okay to return to sports or physical activities.      Where can I find more information?   •[employer]  [address] [id number]  Phone: [phone]  Phone: [phone]  Web Address: [email or web address]        Have someone call 911 for any of the following:   •You have a seizure.      •You have trouble breathing.      •You are not responding or you cannot be woken.      When should I seek immediate care?   •You have a sudden headache that seems different or much worse than your usual headaches.      •You cannot stop vomiting.      •You have sudden changes in your vision.      When should I contact my healthcare provider?   •You have nausea or are vomiting.      •You have trouble concentrating.      •You have difficulty speaking or thinking.      •Your symptoms get worse.      •You have questions or concerns about your condition or care.      CARE AGREEMENT:    You have the right to help plan your care. Learn about your health condition and how it may be treated. Discuss treatment options with your healthcare providers to decide what care you want to receive. You always have the right to refuse treatment.        © Copyright [employer] 2022        Elbow Bursitis       Bursitis is swelling and pain at the tip of the elbow. This happens when fluid builds up in a sac under the skin (bursa). This may also be called olecranon bursitis.      What are the causes?    Elbow bursitis may be caused by:  •Elbow injury, such as falling onto the elbow.      •Leaning on hard surfaces for long periods of time.      •Infection from an injury that breaks the skin near the elbow.      •A bone growth (spur) that forms at the tip of the elbow.      •A medical condition that causes inflammation, such as gout or rheumatoid arthritis.      Sometimes the cause is not known.      What are the signs or symptoms?    The first sign of elbow bursitis is usually swelling at the tip of the elbow. This can grow to be about the size of a golf ball. Swelling may start suddenly or develop gradually. Other symptoms may include:  •Pain when bending or leaning on the elbow.      •Not being able to move the elbow normally.      If bursitis is caused by an infection, you may have:  •Redness, warmth, and tenderness of the elbow.      •Drainage of pus from the swollen area over the elbow, if the skin breaks open.        How is this diagnosed?    This condition may be diagnosed based on:  •Your symptoms and medical history.      •Any recent injuries you have had.      •A physical exam.      •X-rays to check for a bone spur or fracture.      •Draining fluid from the bursa to test it for infection.      •Blood tests to rule out gout or rheumatoid arthritis.        How is this treated?    Treatment for elbow bursitis depends on the cause. Treatment may include:•Medicines. These may include:  •Over-the-counter medicines to relieve pain and inflammation.      •Antibiotic medicines.      •Injections of anti-inflammatory medicines (steroids).        •Draining fluid from the bursa.      •Wrapping your elbow with a bandage.      •Wearing elbow pads.      If these treatments do not help, you may need surgery to remove the bursa.      Follow these instructions at home:    Medicines     •Take over-the-counter and prescription medicines only as told by your health care provider.      •If you were prescribed an antibiotic medicine, take it as told by your health care provider. Do not stop taking the antibiotic even if you start to feel better.        Managing pain, stiffness, and swelling    •If directed, put ice on your elbow:  •Put ice in a plastic bag.      •Place a towel between your skin and the bag.      •Leave the ice on for 20 minutes, 2–3 times a day.        •If your bursitis is caused by an injury, rest your elbow and wear your bandage as told by your health care provider.      •Use elbow pads or elbow wraps to cushion your elbow as needed.      General instructions     •Avoid any activities that cause elbow pain. Ask your health care provider what activities are safe for you.      •Keep all follow-up visits as told by your health care provider. This is important.        Contact a health care provider if you have:    •A fever.      •Symptoms that do not get better with treatment.    •Pain or swelling that:  •Gets worse.      •Goes away and then comes back.        •Pus draining from your elbow.        Get help right away if you have:    •Trouble moving your arm, hand, or fingers.        Summary    •Elbow bursitis is inflammation of the fluid-filled sac (bursa) between the tip of your elbow bone (olecranon) and your skin.      •Treatment for elbow bursitis depends on the cause. It may include medicines to relieve pain and inflammation, antibiotic medicines, and draining fluid from your elbow.      •Contact a health care provider if your symptoms do not get better with treatment, or if your symptoms go away and then come back.      This information is not intended to replace advice given to you by your health care provider. Make sure you discuss any questions you have with your health care provider.      Document Revised: 06/23/2021 Document Reviewed: 06/23/2021    [employer] Patient Education © 2022 [employer] FOLLOW UP WITH DR MAGANA'S NURSE PRACTITIONER APT YOUR APPOINTMENT ON THURSDAY. FOLLOW UP WITH DR ELLIS ON MAY 4. RETURN TO ER FOR ANY WORSENING SYMPTOMS OR NEW CONCERNS.     Post Concussion Syndrome    WHAT YOU NEED TO KNOW:    What is post-concussion syndrome (PCS)? PCS is a group of symptoms that affect your nerves, thinking, and behavior. PCS develops shortly after a concussion and can last for weeks to months.    What increases my risk for PCS?   •Older age      •A substance abuse problem, such as drugs or alcohol      •A past head injury      •A current mood or anxiety disorder      •Poor physical health before your concussion      What are the signs and symptoms of PCS?   •Headaches or vision problems      •Dizziness or poor balance      •Forgetfulness or problems concentrating      •Problems with sleep      •Changes in your personality      •Seizures      •Depression or anxiety      How is PCS diagnosed? Your healthcare provider will ask about your injury. Tell the provider when it happened, what hit you, and the force of the blow. You, or someone close to you, should tell your provider about any confusion you have or changes in your behavior. You may need any of the following:   •A neurologic exam is used to test your memory and your ability to recognize familiar things. It will also show healthcare providers your eye, verbal, and muscle responses.      •Blood and urine tests will be done to make sure there is no other cause for your symptoms. Infections and chemicals, such as alcohol, can affect your memory and behavior.      •CT scan pictures of your head may show an injury. You may be given contrast liquid to help healthcare providers see the pictures better. Tell the healthcare provider if you have ever had an allergic reaction to contrast liquid.      How is PCS treated? Treatment of PCS will focus on your symptoms:   •Acetaminophen decreases pain and fever. It is available without a doctor's order. Ask how much to take and how often to take it. Follow directions. Read the labels of all other medicines you are using to see if they also contain acetaminophen, or ask your doctor or pharmacist. Acetaminophen can cause liver damage if not taken correctly. Do not use more than 4 grams (4,000 milligrams) total of acetaminophen in one day.       •NSAIDs help decrease swelling and pain or fever. This medicine is available with or without a doctor's order. NSAIDs can cause stomach bleeding or kidney problems in certain people. If you take blood thinner medicine, always ask your healthcare provider if NSAIDs are safe for you. Always read the medicine label and follow directions.      •Antidepressants may be given for depression or sleep problems.      •Migraine medicines may be given for migraine headaches.      What are the risks of PCS? PCS may decrease your ability to function at home, school, or work. You may develop persistent post-concussion syndrome. You may develop second-impact syndrome if you have another concussion before you have recovered from the first. Second-impact syndrome can be life-threatening.    How can I prevent PCS?   •Make your home safe. Home safety measures can help prevent head injuries that could lead to a concussion. Install handrails for every staircase. Put soft bumpers on furniture edges and corners. Secure furniture, such as dressers and book cases so they do not fall over.      •Always wear a seatbelt in the car. This helps decrease your risk for a head injury if you are in a car accident.      •Wear protective sports equipment that fits properly. Helmets help decrease your risk for a serious brain injury. Talk to your healthcare provider about other ways that you can decrease your risk for a concussion if you play sports. Ask for more information about sports concussions.      What can I do to manage my symptoms?   •Rest from physical and mental activities as directed. Mental activities need you to think, concentrate, and pay attention. Rest will help you recover from your concussion. Ask your healthcare provider when you can return to school and other daily activities.      •Go to therapy as directed. A cognitive behavioral therapist teaches you skills to help with any thinking and behavior problems you may have. An occupational therapist teaches your skills to help with daily activities.      •Do not participate in sports or physical activities until your healthcare provider says it is okay.These activities could make your symptoms worse or lead to another concussion. Your healthcare provider will tell you when it is okay to return to sports or physical activities.      Where can I find more information?   •[employer]  [address], [place]  Phone: [phone]  Phone: [phone]  Web Address: http://www.[employer]        Have someone call 911 for any of the following:   •You have a seizure.      •You have trouble breathing.      •You are not responding or you cannot be woken.      When should I seek immediate care?   •You have a sudden headache that seems different or much worse than your usual headaches.      •You cannot stop vomiting.      •You have sudden changes in your vision.      When should I contact my healthcare provider?   •You have nausea or are vomiting.      •You have trouble concentrating.      •You have difficulty speaking or thinking.      •Your symptoms get worse.      •You have questions or concerns about your condition or care.      CARE AGREEMENT:    You have the right to help plan your care. Learn about your health condition and how it may be treated. Discuss treatment options with your healthcare providers to decide what care you want to receive. You always have the right to refuse treatment.        © Copyright [email or web address] 2022        Elbow Bursitis       Bursitis is swelling and pain at the tip of the elbow. This happens when fluid builds up in a sac under the skin (bursa). This may also be called olecranon bursitis.      What are the causes?    Elbow bursitis may be caused by:  •Elbow injury, such as falling onto the elbow.      •Leaning on hard surfaces for long periods of time.      •Infection from an injury that breaks the skin near the elbow.      •A bone growth (spur) that forms at the tip of the elbow.      •A medical condition that causes inflammation, such as gout or rheumatoid arthritis.      Sometimes the cause is not known.      What are the signs or symptoms?    The first sign of elbow bursitis is usually swelling at the tip of the elbow. This can grow to be about the size of a golf ball. Swelling may start suddenly or develop gradually. Other symptoms may include:  •Pain when bending or leaning on the elbow.      •Not being able to move the elbow normally.      If bursitis is caused by an infection, you may have:  •Redness, warmth, and tenderness of the elbow.      •Drainage of pus from the swollen area over the elbow, if the skin breaks open.        How is this diagnosed?    This condition may be diagnosed based on:  •Your symptoms and medical history.      •Any recent injuries you have had.      •A physical exam.      •X-rays to check for a bone spur or fracture.      •Draining fluid from the bursa to test it for infection.      •Blood tests to rule out gout or rheumatoid arthritis.        How is this treated?    Treatment for elbow bursitis depends on the cause. Treatment may include:•Medicines. These may include:  •Over-the-counter medicines to relieve pain and inflammation.      •Antibiotic medicines.      •Injections of anti-inflammatory medicines (steroids).        •Draining fluid from the bursa.      •Wrapping your elbow with a bandage.      •Wearing elbow pads.      If these treatments do not help, you may need surgery to remove the bursa.      Follow these instructions at home:    Medicines     •Take over-the-counter and prescription medicines only as told by your health care provider.      •If you were prescribed an antibiotic medicine, take it as told by your health care provider. Do not stop taking the antibiotic even if you start to feel better.        Managing pain, stiffness, and swelling    •If directed, put ice on your elbow:  •Put ice in a plastic bag.      •Place a towel between your skin and the bag.      •Leave the ice on for 20 minutes, 2–3 times a day.        •If your bursitis is caused by an injury, rest your elbow and wear your bandage as told by your health care provider.      •Use elbow pads or elbow wraps to cushion your elbow as needed.      General instructions     •Avoid any activities that cause elbow pain. Ask your health care provider what activities are safe for you.      •Keep all follow-up visits as told by your health care provider. This is important.        Contact a health care provider if you have:    •A fever.      •Symptoms that do not get better with treatment.    •Pain or swelling that:  •Gets worse.      •Goes away and then comes back.        •Pus draining from your elbow.        Get help right away if you have:    •Trouble moving your arm, hand, or fingers.        Summary    •Elbow bursitis is inflammation of the fluid-filled sac (bursa) between the tip of your elbow bone (olecranon) and your skin.      •Treatment for elbow bursitis depends on the cause. It may include medicines to relieve pain and inflammation, antibiotic medicines, and draining fluid from your elbow.      •Contact a health care provider if your symptoms do not get better with treatment, or if your symptoms go away and then come back.      This information is not intended to replace advice given to you by your health care provider. Make sure you discuss any questions you have with your health care provider.      Document Revised: 06/23/2021 Document Reviewed: 06/23/2021    [employer] Patient Education © 2022 [employer]

## 2022-04-18 NOTE — ED STATDOCS - PROVIDER TOKENS
PROVIDER:[TOKEN:[[id number]]] PROVIDER:[TOKEN:[[id number]]],PROVIDER:[TOKEN:[[id number]]]

## 2022-04-18 NOTE — ED STATDOCS - NS ED ATTENDING STATEMENT MOD
This was a shared visit with the STEPHANIE. I reviewed and verified the documentation and independently performed the documented:

## 2022-04-18 NOTE — ED STATDOCS - ATTENDING CONTRIBUTION TO CARE
I personally saw the patient with the STEPHANIE, and completed the key components of the history and physical exam. I then discussed the management plan with the STEPHANIE.

## 2022-04-18 NOTE — ED ADULT TRIAGE NOTE - CHIEF COMPLAINT QUOTE
patient ambulatory to ED with multiple medical complaints.  patient reports fall with head injury the last week of february.  patient reports waking up nauseous, feeling "off balance" and having severe headaches since injury.  patient reports decrease in appetite and intermittent episodes of vomiting.  patient reports symptoms have been worsening the past 6 weeks.

## 2022-04-21 ENCOUNTER — APPOINTMENT (OUTPATIENT)
Dept: NEUROSURGERY | Facility: CLINIC | Age: 62
End: 2022-04-21
Payer: COMMERCIAL

## 2022-04-21 VITALS
TEMPERATURE: 96.7 F | HEIGHT: 69 IN | WEIGHT: 170 LBS | BODY MASS INDEX: 25.18 KG/M2 | DIASTOLIC BLOOD PRESSURE: 79 MMHG | SYSTOLIC BLOOD PRESSURE: 122 MMHG | HEART RATE: 79 BPM | OXYGEN SATURATION: 99 %

## 2022-04-21 PROCEDURE — 99204 OFFICE O/P NEW MOD 45 MIN: CPT

## 2022-04-29 ENCOUNTER — NON-APPOINTMENT (OUTPATIENT)
Age: 62
End: 2022-04-29

## 2022-05-04 ENCOUNTER — INPATIENT (INPATIENT)
Facility: HOSPITAL | Age: 62
LOS: 3 days | Discharge: HOME | DRG: 25 | End: 2022-05-08
Attending: SPECIALIST | Admitting: SPECIALIST
Payer: COMMERCIAL

## 2022-05-04 ENCOUNTER — APPOINTMENT (OUTPATIENT)
Dept: ORTHOPEDIC SURGERY | Facility: CLINIC | Age: 62
End: 2022-05-04

## 2022-05-04 ENCOUNTER — TRANSCRIPTION ENCOUNTER (OUTPATIENT)
Age: 62
End: 2022-05-04

## 2022-05-04 ENCOUNTER — APPOINTMENT (OUTPATIENT)
Dept: NEUROSURGERY | Facility: HOSPITAL | Age: 62
End: 2022-05-04

## 2022-05-04 ENCOUNTER — NON-APPOINTMENT (OUTPATIENT)
Age: 62
End: 2022-05-04

## 2022-05-04 VITALS — HEIGHT: 68 IN | WEIGHT: 169.98 LBS

## 2022-05-04 DIAGNOSIS — E44.0 MODERATE PROTEIN-CALORIE MALNUTRITION: ICD-10-CM

## 2022-05-04 DIAGNOSIS — I62.00 NONTRAUMATIC SUBDURAL HEMORRHAGE, UNSPECIFIED: ICD-10-CM

## 2022-05-04 DIAGNOSIS — G93.5 COMPRESSION OF BRAIN: ICD-10-CM

## 2022-05-04 DIAGNOSIS — S06.5X9A TRAUMATIC SUBDURAL HEMORRHAGE WITH LOSS OF CONSCIOUSNESS OF UNSPECIFIED DURATION, INITIAL ENCOUNTER: ICD-10-CM

## 2022-05-04 DIAGNOSIS — N40.0 BENIGN PROSTATIC HYPERPLASIA WITHOUT LOWER URINARY TRACT SYMPTOMS: ICD-10-CM

## 2022-05-04 LAB
ALBUMIN SERPL ELPH-MCNC: 3.5 G/DL — SIGNIFICANT CHANGE UP (ref 3.3–5)
ALP SERPL-CCNC: 95 U/L — SIGNIFICANT CHANGE UP (ref 40–120)
ALT FLD-CCNC: 16 U/L — SIGNIFICANT CHANGE UP (ref 12–78)
ANION GAP SERPL CALC-SCNC: 4 MMOL/L — LOW (ref 5–17)
APPEARANCE UR: CLEAR — SIGNIFICANT CHANGE UP
APTT BLD: 27 SEC — LOW (ref 27.5–35.5)
AST SERPL-CCNC: 8 U/L — LOW (ref 15–37)
BASOPHILS # BLD AUTO: 0.03 K/UL — SIGNIFICANT CHANGE UP (ref 0–0.2)
BASOPHILS NFR BLD AUTO: 0.3 % — SIGNIFICANT CHANGE UP (ref 0–2)
BILIRUB SERPL-MCNC: 0.2 MG/DL — SIGNIFICANT CHANGE UP (ref 0.2–1.2)
BILIRUB UR-MCNC: NEGATIVE — SIGNIFICANT CHANGE UP
BUN SERPL-MCNC: 26 MG/DL — HIGH (ref 7–23)
CALCIUM SERPL-MCNC: 9 MG/DL — SIGNIFICANT CHANGE UP (ref 8.5–10.1)
CHLORIDE SERPL-SCNC: 111 MMOL/L — HIGH (ref 96–108)
CO2 SERPL-SCNC: 29 MMOL/L — SIGNIFICANT CHANGE UP (ref 22–31)
COLOR SPEC: YELLOW — SIGNIFICANT CHANGE UP
CREAT SERPL-MCNC: 1.02 MG/DL — SIGNIFICANT CHANGE UP (ref 0.5–1.3)
DIFF PNL FLD: NEGATIVE — SIGNIFICANT CHANGE UP
EGFR: 84 ML/MIN/1.73M2 — SIGNIFICANT CHANGE UP
EOSINOPHIL # BLD AUTO: 0.56 K/UL — HIGH (ref 0–0.5)
EOSINOPHIL NFR BLD AUTO: 4.9 % — SIGNIFICANT CHANGE UP (ref 0–6)
GLUCOSE SERPL-MCNC: 92 MG/DL — SIGNIFICANT CHANGE UP (ref 70–99)
GLUCOSE UR QL: NEGATIVE — SIGNIFICANT CHANGE UP
HCT VFR BLD CALC: 38 % — LOW (ref 39–50)
HGB BLD-MCNC: 12.8 G/DL — LOW (ref 13–17)
IMM GRANULOCYTES NFR BLD AUTO: 0.4 % — SIGNIFICANT CHANGE UP (ref 0–1.5)
INR BLD: 1 RATIO — SIGNIFICANT CHANGE UP (ref 0.88–1.16)
KETONES UR-MCNC: NEGATIVE — SIGNIFICANT CHANGE UP
LEUKOCYTE ESTERASE UR-ACNC: NEGATIVE — SIGNIFICANT CHANGE UP
LYMPHOCYTES # BLD AUTO: 2.63 K/UL — SIGNIFICANT CHANGE UP (ref 1–3.3)
LYMPHOCYTES # BLD AUTO: 23.2 % — SIGNIFICANT CHANGE UP (ref 13–44)
MCHC RBC-ENTMCNC: 32.2 PG — SIGNIFICANT CHANGE UP (ref 27–34)
MCHC RBC-ENTMCNC: 33.7 GM/DL — SIGNIFICANT CHANGE UP (ref 32–36)
MCV RBC AUTO: 95.5 FL — SIGNIFICANT CHANGE UP (ref 80–100)
MONOCYTES # BLD AUTO: 0.81 K/UL — SIGNIFICANT CHANGE UP (ref 0–0.9)
MONOCYTES NFR BLD AUTO: 7.1 % — SIGNIFICANT CHANGE UP (ref 2–14)
NEUTROPHILS # BLD AUTO: 7.28 K/UL — SIGNIFICANT CHANGE UP (ref 1.8–7.4)
NEUTROPHILS NFR BLD AUTO: 64.1 % — SIGNIFICANT CHANGE UP (ref 43–77)
NITRITE UR-MCNC: NEGATIVE — SIGNIFICANT CHANGE UP
PH UR: 6 — SIGNIFICANT CHANGE UP (ref 5–8)
PLATELET # BLD AUTO: 260 K/UL — SIGNIFICANT CHANGE UP (ref 150–400)
POTASSIUM SERPL-MCNC: 4.1 MMOL/L — SIGNIFICANT CHANGE UP (ref 3.5–5.3)
POTASSIUM SERPL-SCNC: 4.1 MMOL/L — SIGNIFICANT CHANGE UP (ref 3.5–5.3)
PROT SERPL-MCNC: 6.9 GM/DL — SIGNIFICANT CHANGE UP (ref 6–8.3)
PROT UR-MCNC: NEGATIVE — SIGNIFICANT CHANGE UP
PROTHROM AB SERPL-ACNC: 11.6 SEC — SIGNIFICANT CHANGE UP (ref 10.5–13.4)
RBC # BLD: 3.98 M/UL — LOW (ref 4.2–5.8)
RBC # FLD: 12.2 % — SIGNIFICANT CHANGE UP (ref 10.3–14.5)
SARS-COV-2 RNA SPEC QL NAA+PROBE: SIGNIFICANT CHANGE UP
SODIUM SERPL-SCNC: 144 MMOL/L — SIGNIFICANT CHANGE UP (ref 135–145)
SP GR SPEC: 1.02 — SIGNIFICANT CHANGE UP (ref 1.01–1.02)
UROBILINOGEN FLD QL: NEGATIVE — SIGNIFICANT CHANGE UP
WBC # BLD: 11.35 K/UL — HIGH (ref 3.8–10.5)
WBC # FLD AUTO: 11.35 K/UL — HIGH (ref 3.8–10.5)

## 2022-05-04 PROCEDURE — 70450 CT HEAD/BRAIN W/O DYE: CPT

## 2022-05-04 PROCEDURE — 71045 X-RAY EXAM CHEST 1 VIEW: CPT | Mod: 26

## 2022-05-04 PROCEDURE — 83735 ASSAY OF MAGNESIUM: CPT

## 2022-05-04 PROCEDURE — 84443 ASSAY THYROID STIM HORMONE: CPT

## 2022-05-04 PROCEDURE — 61154 BURR HOLE W/EVAC&/DRG HMTMA: CPT | Mod: 50

## 2022-05-04 PROCEDURE — 99285 EMERGENCY DEPT VISIT HI MDM: CPT

## 2022-05-04 PROCEDURE — 85025 COMPLETE CBC W/AUTO DIFF WBC: CPT

## 2022-05-04 PROCEDURE — 97116 GAIT TRAINING THERAPY: CPT | Mod: GP

## 2022-05-04 PROCEDURE — 93010 ELECTROCARDIOGRAM REPORT: CPT

## 2022-05-04 PROCEDURE — 83036 HEMOGLOBIN GLYCOSYLATED A1C: CPT

## 2022-05-04 PROCEDURE — 36415 COLL VENOUS BLD VENIPUNCTURE: CPT

## 2022-05-04 PROCEDURE — 61154 BURR HOLE W/EVAC&/DRG HMTMA: CPT | Mod: AS,50

## 2022-05-04 PROCEDURE — 71045 X-RAY EXAM CHEST 1 VIEW: CPT

## 2022-05-04 PROCEDURE — 86803 HEPATITIS C AB TEST: CPT

## 2022-05-04 PROCEDURE — 70450 CT HEAD/BRAIN W/O DYE: CPT | Mod: 26

## 2022-05-04 PROCEDURE — 82962 GLUCOSE BLOOD TEST: CPT

## 2022-05-04 PROCEDURE — 81003 URINALYSIS AUTO W/O SCOPE: CPT

## 2022-05-04 PROCEDURE — 80053 COMPREHEN METABOLIC PANEL: CPT

## 2022-05-04 PROCEDURE — C1713: CPT

## 2022-05-04 PROCEDURE — 93005 ELECTROCARDIOGRAM TRACING: CPT

## 2022-05-04 PROCEDURE — 84100 ASSAY OF PHOSPHORUS: CPT

## 2022-05-04 PROCEDURE — C1889: CPT

## 2022-05-04 RX ORDER — ONDANSETRON 8 MG/1
4 TABLET, FILM COATED ORAL EVERY 6 HOURS
Refills: 0 | Status: DISCONTINUED | OUTPATIENT
Start: 2022-05-04 | End: 2022-05-08

## 2022-05-04 RX ORDER — DEXTROSE 50 % IN WATER 50 %
15 SYRINGE (ML) INTRAVENOUS ONCE
Refills: 0 | Status: DISCONTINUED | OUTPATIENT
Start: 2022-05-04 | End: 2022-05-06

## 2022-05-04 RX ORDER — OXYCODONE HYDROCHLORIDE 5 MG/1
5 TABLET ORAL EVERY 4 HOURS
Refills: 0 | Status: DISCONTINUED | OUTPATIENT
Start: 2022-05-04 | End: 2022-05-08

## 2022-05-04 RX ORDER — TAMSULOSIN HYDROCHLORIDE 0.4 MG/1
0.4 CAPSULE ORAL ONCE
Refills: 0 | Status: COMPLETED | OUTPATIENT
Start: 2022-05-04 | End: 2022-05-04

## 2022-05-04 RX ORDER — FOLIC ACID 0.8 MG
1 TABLET ORAL DAILY
Refills: 0 | Status: DISCONTINUED | OUTPATIENT
Start: 2022-05-04 | End: 2022-05-08

## 2022-05-04 RX ORDER — FENTANYL CITRATE 50 UG/ML
50 INJECTION INTRAVENOUS
Refills: 0 | Status: DISCONTINUED | OUTPATIENT
Start: 2022-05-04 | End: 2022-05-04

## 2022-05-04 RX ORDER — ACETAMINOPHEN 500 MG
1000 TABLET ORAL ONCE
Refills: 0 | Status: COMPLETED | OUTPATIENT
Start: 2022-05-04 | End: 2022-05-04

## 2022-05-04 RX ORDER — OXYCODONE HYDROCHLORIDE 5 MG/1
10 TABLET ORAL EVERY 4 HOURS
Refills: 0 | Status: DISCONTINUED | OUTPATIENT
Start: 2022-05-04 | End: 2022-05-08

## 2022-05-04 RX ORDER — INSULIN LISPRO 100/ML
VIAL (ML) SUBCUTANEOUS
Refills: 0 | Status: DISCONTINUED | OUTPATIENT
Start: 2022-05-04 | End: 2022-05-06

## 2022-05-04 RX ORDER — DEXTROSE 50 % IN WATER 50 %
25 SYRINGE (ML) INTRAVENOUS ONCE
Refills: 0 | Status: DISCONTINUED | OUTPATIENT
Start: 2022-05-04 | End: 2022-05-06

## 2022-05-04 RX ORDER — LEVETIRACETAM 250 MG/1
750 TABLET, FILM COATED ORAL EVERY 12 HOURS
Refills: 0 | Status: DISCONTINUED | OUTPATIENT
Start: 2022-05-04 | End: 2022-05-05

## 2022-05-04 RX ORDER — LABETALOL HCL 100 MG
10 TABLET ORAL
Refills: 0 | Status: DISCONTINUED | OUTPATIENT
Start: 2022-05-04 | End: 2022-05-05

## 2022-05-04 RX ORDER — THIAMINE MONONITRATE (VIT B1) 100 MG
100 TABLET ORAL DAILY
Refills: 0 | Status: DISCONTINUED | OUTPATIENT
Start: 2022-05-04 | End: 2022-05-08

## 2022-05-04 RX ORDER — HYDROMORPHONE HYDROCHLORIDE 2 MG/ML
1 INJECTION INTRAMUSCULAR; INTRAVENOUS; SUBCUTANEOUS EVERY 4 HOURS
Refills: 0 | Status: DISCONTINUED | OUTPATIENT
Start: 2022-05-04 | End: 2022-05-08

## 2022-05-04 RX ORDER — SODIUM CHLORIDE 9 MG/ML
1000 INJECTION, SOLUTION INTRAVENOUS
Refills: 0 | Status: DISCONTINUED | OUTPATIENT
Start: 2022-05-04 | End: 2022-05-04

## 2022-05-04 RX ORDER — SODIUM CHLORIDE 9 MG/ML
1000 INJECTION, SOLUTION INTRAVENOUS
Refills: 0 | Status: DISCONTINUED | OUTPATIENT
Start: 2022-05-04 | End: 2022-05-06

## 2022-05-04 RX ORDER — OXYCODONE HYDROCHLORIDE 5 MG/1
5 TABLET ORAL ONCE
Refills: 0 | Status: DISCONTINUED | OUTPATIENT
Start: 2022-05-04 | End: 2022-05-04

## 2022-05-04 RX ORDER — SENNA PLUS 8.6 MG/1
2 TABLET ORAL AT BEDTIME
Refills: 0 | Status: DISCONTINUED | OUTPATIENT
Start: 2022-05-04 | End: 2022-05-08

## 2022-05-04 RX ORDER — PANTOPRAZOLE SODIUM 20 MG/1
40 TABLET, DELAYED RELEASE ORAL DAILY
Refills: 0 | Status: DISCONTINUED | OUTPATIENT
Start: 2022-05-04 | End: 2022-05-05

## 2022-05-04 RX ORDER — SODIUM CHLORIDE 9 MG/ML
1000 INJECTION INTRAMUSCULAR; INTRAVENOUS; SUBCUTANEOUS
Refills: 0 | Status: DISCONTINUED | OUTPATIENT
Start: 2022-05-04 | End: 2022-05-06

## 2022-05-04 RX ORDER — LABETALOL HCL 100 MG
10 TABLET ORAL
Refills: 0 | Status: DISCONTINUED | OUTPATIENT
Start: 2022-05-04 | End: 2022-05-08

## 2022-05-04 RX ORDER — ONDANSETRON 8 MG/1
4 TABLET, FILM COATED ORAL ONCE
Refills: 0 | Status: DISCONTINUED | OUTPATIENT
Start: 2022-05-04 | End: 2022-05-04

## 2022-05-04 RX ORDER — CEFAZOLIN SODIUM 1 G
2000 VIAL (EA) INJECTION ONCE
Refills: 0 | Status: DISCONTINUED | OUTPATIENT
Start: 2022-05-04 | End: 2022-05-08

## 2022-05-04 RX ADMIN — Medication 400 MILLIGRAM(S): at 22:39

## 2022-05-04 RX ADMIN — FENTANYL CITRATE 50 MICROGRAM(S): 50 INJECTION INTRAVENOUS at 22:39

## 2022-05-04 RX ADMIN — HYDROMORPHONE HYDROCHLORIDE 1 MILLIGRAM(S): 2 INJECTION INTRAMUSCULAR; INTRAVENOUS; SUBCUTANEOUS at 23:45

## 2022-05-04 RX ADMIN — LEVETIRACETAM 400 MILLIGRAM(S): 250 TABLET, FILM COATED ORAL at 21:59

## 2022-05-04 RX ADMIN — Medication 1000 MILLIGRAM(S): at 23:03

## 2022-05-04 RX ADMIN — HYDROMORPHONE HYDROCHLORIDE 1 MILLIGRAM(S): 2 INJECTION INTRAMUSCULAR; INTRAVENOUS; SUBCUTANEOUS at 23:30

## 2022-05-04 RX ADMIN — SODIUM CHLORIDE 75 MILLILITER(S): 9 INJECTION INTRAMUSCULAR; INTRAVENOUS; SUBCUTANEOUS at 23:17

## 2022-05-04 RX ADMIN — OXYCODONE HYDROCHLORIDE 5 MILLIGRAM(S): 5 TABLET ORAL at 23:38

## 2022-05-04 RX ADMIN — FENTANYL CITRATE 50 MICROGRAM(S): 50 INJECTION INTRAVENOUS at 22:15

## 2022-05-04 RX ADMIN — TAMSULOSIN HYDROCHLORIDE 0.4 MILLIGRAM(S): 0.4 CAPSULE ORAL at 17:59

## 2022-05-04 NOTE — CONSULT NOTE ADULT - SUBJECTIVE AND OBJECTIVE BOX
Patient is a 62y old  Male who presents with a chief complaint of bilateral subdural hematomas (04 May 2022 19:30)    BRIEF HOSPITAL COURSE:   HPI:  Neuro Surgery Admission:  63 y/o male w/ a PMHx of BPH presents to the ED from [facility] for abnormal MRI showing bilateral SDH.  Pt reports he was at  ED s/p syncopal episode, + fall, +head truama, +LOC on 22 and again in 22.  Pt seen by Dr. Morfin for worsening complaints of frontal HA, nausea, and lack of movement in b/l arms. Pt also c/o right hand/arm numbness. Pt was seen at a concussion center with Dr. Morfin and was referred to [facility] for an MRI which he had today and was found to have b/l brain bleeds. Pt endorses taking Advil 4 x a day w/ moderate improvement. Denies vison or speech changes. Denies incontinence, dizziness, or SOB, or CP. Pt able to ambulate but is unsteady.  (04 May 2022 19:30)    Events last 24 hours:   - Patient seen at bedside, doing well, complains of HA  - S/p bilateral subacute subdural hematomas - bilateral frontal jose holes with good decompression. subdural PAULINO drains placed    Review of Systems:  +HA    PAST MEDICAL & SURGICAL HISTORY:  History of BPH    Medications:  ceFAZolin   IVPB 2000 milliGRAM(s) IV Intermittent once  labetalol Injectable 10 milliGRAM(s) IV Push every 1 hour PRN  labetalol Injectable 10 milliGRAM(s) IV Push every 1 hour PRN  HYDROmorphone  Injectable 1 milliGRAM(s) IV Push every 4 hours PRN  levETIRAcetam  IVPB 750 milliGRAM(s) IV Intermittent every 12 hours  ondansetron Injectable 4 milliGRAM(s) IV Push every 6 hours PRN  oxyCODONE    IR 10 milliGRAM(s) Oral every 4 hours PRN  oxyCODONE    IR 5 milliGRAM(s) Oral every 4 hours PRN  pantoprazole  Injectable 40 milliGRAM(s) IV Push daily  senna 2 Tablet(s) Oral at bedtime PRN  dextrose 50% Injectable 25 Gram(s) IV Push once  dextrose Oral Gel 15 Gram(s) Oral once PRN  insulin lispro (ADMELOG) corrective regimen sliding scale   SubCutaneous four times a day before meals  dextrose 5%. 1000 milliLiter(s) IV Continuous <Continuous>  folic acid 1 milliGRAM(s) Oral daily  multivitamin 1 Tablet(s) Oral daily  sodium chloride 0.9%. 1000 milliLiter(s) IV Continuous <Continuous>  thiamine 100 milliGRAM(s) Oral daily    ICU Vital Signs Last 24 Hrs  T(C): 36.9 (04 May 2022 23:03), Max: 37.1 (04 May 2022 17:01)  T(F): 98.4 (04 May 2022 23:03), Max: 98.7 (04 May 2022 17:01)  HR: 74 (04 May 2022 23:15) (70 - 80)  BP: 124/71 (04 May 2022 23:15) (124/71 - 139/75)  BP(mean): 83 (04 May 2022 23:15) (83 - 93)  RR: 11 (04 May 2022 23:15) (11 - 22)  SpO2: 100% (04 May 2022 23:15) (96% - 100%)    I&O's Detail    04 May 2022 07:01  -  04 May 2022 23:31  --------------------------------------------------------  IN:    Lactated Ringers: 75 mL  Total IN: 75 mL    OUT:    Other (mL): 0 mL  Total OUT: 0 mL    Total NET: 75 mL    LABS:                        12.8   11.35 )-----------( 260      ( 04 May 2022 17:28 )             38.0     05-04    144  |  111<H>  |  26<H>  ----------------------------<  92  4.1   |  29  |  1.02    Ca    9.0      04 May 2022 17:28    TPro  6.9  /  Alb  3.5  /  TBili  0.2  /  DBili  x   /  AST  8<L>  /  ALT  16  /  AlkPhos  95  05-04    POCT Blood Glucose.: 136 mg/dL (04 May 2022 23:13)    PT/INR - ( 04 May 2022 17:28 )   PT: 11.6 sec;   INR: 1.00 ratio    PTT - ( 04 May 2022 17:28 )  PTT:27.0 sec    Urinalysis Basic - ( 04 May 2022 19:37 )  Color: Yellow / Appearance: Clear / S.020 / pH: x  Gluc: x / Ketone: Negative  / Bili: Negative / Urobili: Negative   Blood: x / Protein: Negative / Nitrite: Negative   Leuk Esterase: Negative / RBC: x / WBC x   Sq Epi: x / Non Sq Epi: x / Bacteria: x    Physical Examination:  General: No acute distress.    HEENT: Pupils equal, reactive to light.  Symmetric.  PULM: Clear to auscultation bilaterally, no significant sputum production  NECK: Supple, no lymphadenopathy, trachea midline  CVS: Regular rate and rhythm, no murmurs, rubs, or gallops  ABD: Soft, nondistended, nontender, normoactive bowel sounds, no masses  EXT: No edema, nontender  SKIN: Warm and well perfused, no rashes noted.

## 2022-05-04 NOTE — BRIEF OPERATIVE NOTE - NSICDXBRIEFPREOP_GEN_ALL_CORE_FT
PRE-OP DIAGNOSIS:  Bilateral subdural hematomas 04-May-2022 21:30:51  Kory Morfin  Compression of brain 04-May-2022 21:31:08  Kory Morfin

## 2022-05-04 NOTE — ED ADULT TRIAGE NOTE - CHIEF COMPLAINT QUOTE
Pt presents to er with complaints of bilateral brain bleeds, sent in from [facility] for MRI results 30mns prior to arrival, pt denies change in vision, chest pain, sob at this time.

## 2022-05-04 NOTE — PATIENT PROFILE ADULT - FALL HARM RISK - HARM RISK INTERVENTIONS

## 2022-05-04 NOTE — CONSULT NOTE ADULT - ASSESSMENT
Assessment:  61 y/o male w/ a PMHx of BPH presents to the ED from [facility] for abnormal MRI showing bilateral SDH.  Pt reports he was at  ED s/p syncopal episode, + fall, +head truama, +LOC on 2/26/22 and again in 4/12/22.  Pt seen by Dr. Morfin for worsening complaints of frontal HA, nausea, and lack of movement in b/l arms. Pt also c/o right hand/arm numbness. Pt was seen at a concussion center with Dr. Morfin and was referred to [facility] for an MRI which he had today and was found to have b/l brain bleeds. Pt endorses taking Advil 4 x a day w/ moderate improvement. Denies vison or speech changes. Denies incontinence, dizziness, or SOB, or CP. Pt able to ambulate but is unsteady.     Problem List  - SDH bilaterall  - Brain compression  - BPH    Plan:  - S/p bilateral subacute subdural hematomas - bilateral frontal jose holes with good decompression. subdural PAULINO drains placed  - Pain control  - SBP control, goal <150   - Keppra  - Labetolol prn   - AM labs   - CT head

## 2022-05-04 NOTE — BRIEF OPERATIVE NOTE - OPERATION/FINDINGS
bilateral subacute subdural hematomas - bilateral frontal jose holes with good decompression. subdural PAULINO drains placed

## 2022-05-04 NOTE — ED PROVIDER NOTE - NEUROLOGICAL, MLM
Alert and oriented, no focal deficits, no motor or sensory deficits. Alert and oriented, no focal deficits, no motor or sensory deficits.  CNs II - XII intact, normal speech.  Normal FTN & HTS.

## 2022-05-04 NOTE — H&P ADULT - NSHPREVIEWOFSYSTEMS_GEN_ALL_CORE
Review of Symptoms  	Gen: no fevers, chills, sweats, weight loss, fatigue  	Visual: no recent changes in vision, no blurriness, no seeing spots  	Cardiovascular: no chest pain, no palpitations, no orthopnea, no leg swelling  	Respiratory: no shortness of breath, no exertional dyspnea, no cough, no rhinorrhea, no nasal congestion  	GI: no difficulty swallowing, no nausea, no vomiting, no abdominal pain, no diarrhea, no constipation, no melana  	: no dysuria, no increased freq, no hematuria, no malodorous urine  	Derm: no wounds, no rashes  	Heme: no easy bleeding or bruising  	MSK: no joint pain, no joint swelling or redness, no extremity pain                 Neuro: + headache, no weakness or dysesthesias.  right arm intermittent clumsiness. More confusion.

## 2022-05-04 NOTE — H&P ADULT - NSHPLABSRESULTS_GEN_ALL_CORE
Labs:                        12.8   11.35 )-----------( 260      ( 04 May 2022 17:28 )             38.0     05-04    144  |  111<H>  |  26<H>  ----------------------------<  92  4.1   |  29  |  1.02    Ca    9.0      04 May 2022 17:28  TPro  6.9  /  Alb  3.5  /  TBili  0.2  /  DBili  x   /  AST  8<L>  /  ALT  16  /  AlkPhos  95  05-04    PT/INR - ( 04 May 2022 17:28 )   PT: 11.6 sec;   INR: 1.00 ratio    PTT - ( 04 May 2022 17:28 )  PTT:27.0 sec    MRI outpt reviewed by Dr. Morfin - sent to ED for emergent evacuation of bilateral Subdural hematomas

## 2022-05-04 NOTE — ED PROVIDER NOTE - CONSTITUTIONAL, MLM
normal... White male, Well appearing, awake, alert, oriented to person, place, time/situation and in no apparent distress.

## 2022-05-04 NOTE — ED PROVIDER NOTE - MUSCULOSKELETAL, MLM
Spine appears normal, range of motion is not limited, no muscle or joint tenderness Spine appears normal, range of motion is not limited, no muscle or joint tenderness.  RODRÍGUEZ x 4, normal motor.

## 2022-05-04 NOTE — ED PROVIDER NOTE - PROGRESS NOTE DETAILS
Nithin Chaves for attending Dr. Honeycutt:  [facility] neurosurgery PA. Nithin Chaves for attending Dr. Honeycutt:  Case discussed w/ M Josh, neuro PA. Outpt MRI brain today +subacute on chronic b/l hematomas. Coming into eval pt but is to be admitted under Dr. Morfin for neuro service. Nithin Chaves for attending Dr. Honeycutt:  Case discussed w/ M Josh neuro PA. Outpt MRI brain today +subacute on chronic b/l hematomas. Coming into eval pt but pt is to be admitted under Dr. Morfin for [facility] service.

## 2022-05-04 NOTE — BRIEF OPERATIVE NOTE - NSICDXBRIEFPOSTOP_GEN_ALL_CORE_FT
POST-OP DIAGNOSIS:  Bilateral subdural hematomas 04-May-2022 21:31:27  Kory Morfin  Brain compression 04-May-2022 21:31:39  Kory Morfin   Interactive/Motor strength normal in all extremities/Affect appropriate/Sensation intact to touch/Deep tendon reflexes intact and symmetric

## 2022-05-04 NOTE — H&P ADULT - NSHPPHYSICALEXAM_GEN_ALL_CORE
Vital Signs Last 24 Hrs  T(C): 37.1 (04 May 2022 17:01), Max: 37.1 (04 May 2022 17:01)  T(F): 98.7 (04 May 2022 17:01), Max: 98.7 (04 May 2022 17:01)  HR: 76 (04 May 2022 17:01) (76 - 76)  BP: 139/75 (04 May 2022 17:01) (139/75 - 139/75)  BP(mean): 93 (04 May 2022 17:01) (93 - 93)  RR: 18 (04 May 2022 17:01) (18 - 18)  SpO2: 96% (04 May 2022 17:01) (96% - 96%)    Physical Exam:  Constitutional: Awake / alert  HEENT: PERRLA, EOMI  Neck: Supple  Respiratory: Breath sounds are clear bilaterally  Cardiovascular: S1 and S2, regular rhythm  Gastrointestinal: Soft, NT/ND  Extremities:  no edema  Vascular: No carotid Bruit  Musculoskeletal: no joint swelling/tenderness, no abnormal movements  Skin: No rashes    Neurological Exam:  HF: A x O x 3, appropriately interactive, normal affect, speech fluent, no aphasia or paraphasic errors. Naming /repetition intact   CN: PERRL, EOMI, VFF, facial sensation normal, no NLFD, tongue midline  Motor: No pronator drift, Strength 5/5 in all 4 ext, normal bulk and tone, no tremor, rigidity or bradykinesia  Sens: Intact to light touch  Reflexes: Symmetric and normal, downgoing toes b/l  Coord:  No FNFA, dysmetria, BANDAR intact   Gait/Balance: Right leg slower to execute movements when commanded to do so.

## 2022-05-04 NOTE — ED PROVIDER NOTE - CLINICAL SUMMARY MEDICAL DECISION MAKING FREE TEXT BOX
61 y/o male BIB wife from outpt MRI center w/ reported "blood in brain" pt s/p syncope in Feb w/ head injury. evaluated and d/c w/ follow up +concussion referred to [facility] concussion clinic which arranged outpt MRI done today, +continued bifrontal HA, intermittent numbness right arm/hand, normal neuro exam. Vital signs stable pt in NAD. Pt did not arrive w/ any documentation of MRI findings. Plan: preop/admit labs, EKG, CXR, fall precautions, discussed w/ neuro who reportedly already know of case. Expect admission. 61 y/o male BIB wife from outpt MRI center w/ reported "blood in brain" pt s/p syncope in Feb w/ head injury. evaluated and d/c w/ follow up +concussion referred to [facility] concussion clinic which arranged outpt MRI done today, +continued bifrontal HA, intermittent numbness right arm/hand, normal neuro exam. Vital signs stable pt in NAD. Pt did not arrive w/ any documentation of MRI findings.   Plan: preop/admit labs, EKG, CXR, fall precautions, discuss w/ ADRIEN svce. who reportedly already know of case. Expect admission.

## 2022-05-04 NOTE — ED PROVIDER NOTE - ENMT, MLM
Airway patent, Nasal mucosa clear. Mouth with moist mucosa. Throat has no vesicles, no oropharyngeal exudates and uvula is midline. NC/AT. Airway patent, Nasal mucosa clear. Mouth with moist mucosa. Throat has no vesicles, no oropharyngeal exudates and uvula is midline.

## 2022-05-04 NOTE — H&P ADULT - ASSESSMENT
Patient has an estimated Caprini Score of greater than 5.  However, the patient's unique clinical situation will be addressed in an individual manner to determine appropriate anticoagulation treatment, if any.  At this time no chemical DVT prophylaxis. Mechancial DVT prophylaxis is permitted.    Assesment:    61 y/o male w/ a PMHx of BPH presents to the ED from [facility] for abnormal MRI showing bilateral SDH.  Pt reports he was at  ED s/p syncopal episode, + fall, +head truama, +LOC on 2/26/22 and again in 4/12/22.  Pt seen by Dr. Morfin for worsening complaints of frontal HA, nausea, and lack of movement in b/l arms. Pt also c/o right hand/arm numbness. Pt was seen at a concussion center with Dr. Morfin and was referred to [facility] for an MRI which he had today and was found to have b/l brain bleeds. Pt endorses taking Advil 4 x a day w/ moderate improvement. Denies vison or speech changes. Denies incontinence, dizziness, or SOB, or CP. Pt able to ambulate but is unsteady.     Problem List  - SDH bilaterall  - Brain compression  - BPH    1. NPO x meds  2. No reversal of antiplatelet effects per Dr. Morfin  3. OR booked for bilateral evacuation of SDH  4. labs sent, consent obtained via Dr. Morfin, anesthesia evaluating patient accordingly  5. DVT ppx, mechanical only  6. keppra 750 bid for 7 days  7. Abx on call to OR  8. sbp < 160  9. neuro checks q1 hr  10. ICU team notified needs for critical care post operatively  11. Pt being seen by Dr. Morfin - care plan as above and directed by Attending neurosurgeon - Dr. Morfin.

## 2022-05-04 NOTE — H&P ADULT - HISTORY OF PRESENT ILLNESS
Neuro Surgery Admission:      61 y/o male w/ a PMHx of BPH presents to the ED from [facility] for abnormal MRI showing bilateral SDH.  Pt reports he was at  ED s/p syncopal episode, + fall, +head truama, +LOC on 2/26/22 and again in 4/12/22.  Pt seen by Dr. Morfin for worsening complaints of frontal HA, nausea, and lack of movement in b/l arms. Pt also c/o right hand/arm numbness. Pt was seen at a concussion center with Dr. Morfin and was referred to [facility] for an MRI which he had today and was found to have b/l brain bleeds. Pt endorses taking Advil 4 x a day w/ moderate improvement. Denies vison or speech changes. Denies incontinence, dizziness, or SOB, or CP. Pt able to ambulate but is unsteady.     PAST MEDICAL & SURGICAL HISTORY:  History of BPH    FAMILY HISTORY:  Non-contrib    Social Hx:    No Tob  No EtOH    Allergies  No Known Allergies

## 2022-05-04 NOTE — ED PROVIDER NOTE - OBJECTIVE STATEMENT
63 y/o male w/ a PMHx of BPH presents to the ED from [facility] for abnormal MRI results approx 30 min PTA. Pt reports he was at  ED s/p syncopal episode, + fall, +head truama, +LOC on 2/26/22 and again in 4/12/22 for worsening complaints of frontal HA, nausea, and lack of movement in b/l arms. Pt also c/o right hand/arm numbness. Pt was seen at a concussion center and was referred to [facility] for an MRI which he had today and was found to have b/l brain bleeds. Pt endorses taking Advil 4 x a day w/ moderate improvement. Denies vison or speech changes. Denies incontinence, dizziness, or SOB, or CP. Pt able to ambulate but is unsteady. 61 y/o male w/ a PMHx of BPH presents to the ED from [facility] for abnormal outpt MRI results approx 30 min PTA. Pt reports he was evaluated at  ED for syncopal episode with fall & head trauma on 2/26/22: D/C'ed after ED w/u negative and again in 4/12/22 for worsening complaints of frontal HA, nausea, dizziness. Pt also c/o 1 month intermittent right hand/arm numbness. Outpt referral [facility] concussion center and was referred to [facility] for an outpt MRI which he had today and was reported to show b/l "brain bleeds". Pt endorses taking Advil 4 x a day w/ moderate improvement. Denies vison or speech changes. Denies incontinence, dizziness, or SOB, or CP. Pt able to ambulate but is somewhat unsteady.

## 2022-05-04 NOTE — ED PROVIDER NOTE - RESPIRATORY, MLM
Breath sounds clear and equal bilaterally. Breath sounds clear and equal bilaterally.  Normal respirations.

## 2022-05-05 LAB
A1C WITH ESTIMATED AVERAGE GLUCOSE RESULT: 5.8 % — HIGH (ref 4–5.6)
A1C WITH ESTIMATED AVERAGE GLUCOSE RESULT: 5.9 % — HIGH (ref 4–5.6)
ALBUMIN SERPL ELPH-MCNC: 3.3 G/DL — SIGNIFICANT CHANGE UP (ref 3.3–5)
ALP SERPL-CCNC: 100 U/L — SIGNIFICANT CHANGE UP (ref 40–120)
ALT FLD-CCNC: 15 U/L — SIGNIFICANT CHANGE UP (ref 12–78)
ANION GAP SERPL CALC-SCNC: 3 MMOL/L — LOW (ref 5–17)
AST SERPL-CCNC: 9 U/L — LOW (ref 15–37)
BASOPHILS # BLD AUTO: 0.03 K/UL — SIGNIFICANT CHANGE UP (ref 0–0.2)
BASOPHILS NFR BLD AUTO: 0.3 % — SIGNIFICANT CHANGE UP (ref 0–2)
BILIRUB SERPL-MCNC: 0.5 MG/DL — SIGNIFICANT CHANGE UP (ref 0.2–1.2)
BUN SERPL-MCNC: 20 MG/DL — SIGNIFICANT CHANGE UP (ref 7–23)
CALCIUM SERPL-MCNC: 8.9 MG/DL — SIGNIFICANT CHANGE UP (ref 8.5–10.1)
CHLORIDE SERPL-SCNC: 108 MMOL/L — SIGNIFICANT CHANGE UP (ref 96–108)
CO2 SERPL-SCNC: 26 MMOL/L — SIGNIFICANT CHANGE UP (ref 22–31)
CREAT SERPL-MCNC: 0.83 MG/DL — SIGNIFICANT CHANGE UP (ref 0.5–1.3)
EGFR: 100 ML/MIN/1.73M2 — SIGNIFICANT CHANGE UP
EOSINOPHIL # BLD AUTO: 0 K/UL — SIGNIFICANT CHANGE UP (ref 0–0.5)
EOSINOPHIL NFR BLD AUTO: 0 % — SIGNIFICANT CHANGE UP (ref 0–6)
ESTIMATED AVERAGE GLUCOSE: 120 MG/DL — HIGH (ref 68–114)
ESTIMATED AVERAGE GLUCOSE: 123 MG/DL — HIGH (ref 68–114)
GLUCOSE SERPL-MCNC: 150 MG/DL — HIGH (ref 70–99)
HCT VFR BLD CALC: 38.9 % — LOW (ref 39–50)
HCV AB S/CO SERPL IA: 0.1 S/CO — SIGNIFICANT CHANGE UP (ref 0–0.99)
HCV AB SERPL-IMP: SIGNIFICANT CHANGE UP
HGB BLD-MCNC: 13 G/DL — SIGNIFICANT CHANGE UP (ref 13–17)
IMM GRANULOCYTES NFR BLD AUTO: 0.3 % — SIGNIFICANT CHANGE UP (ref 0–1.5)
LYMPHOCYTES # BLD AUTO: 1.07 K/UL — SIGNIFICANT CHANGE UP (ref 1–3.3)
LYMPHOCYTES # BLD AUTO: 9.1 % — LOW (ref 13–44)
MAGNESIUM SERPL-MCNC: 2.2 MG/DL — SIGNIFICANT CHANGE UP (ref 1.6–2.6)
MCHC RBC-ENTMCNC: 31.6 PG — SIGNIFICANT CHANGE UP (ref 27–34)
MCHC RBC-ENTMCNC: 33.4 GM/DL — SIGNIFICANT CHANGE UP (ref 32–36)
MCV RBC AUTO: 94.6 FL — SIGNIFICANT CHANGE UP (ref 80–100)
MONOCYTES # BLD AUTO: 0.32 K/UL — SIGNIFICANT CHANGE UP (ref 0–0.9)
MONOCYTES NFR BLD AUTO: 2.7 % — SIGNIFICANT CHANGE UP (ref 2–14)
NEUTROPHILS # BLD AUTO: 10.31 K/UL — HIGH (ref 1.8–7.4)
NEUTROPHILS NFR BLD AUTO: 87.6 % — HIGH (ref 43–77)
PHOSPHATE SERPL-MCNC: 3.5 MG/DL — SIGNIFICANT CHANGE UP (ref 2.5–4.5)
PLATELET # BLD AUTO: 248 K/UL — SIGNIFICANT CHANGE UP (ref 150–400)
POTASSIUM SERPL-MCNC: 4.5 MMOL/L — SIGNIFICANT CHANGE UP (ref 3.5–5.3)
POTASSIUM SERPL-SCNC: 4.5 MMOL/L — SIGNIFICANT CHANGE UP (ref 3.5–5.3)
PROT SERPL-MCNC: 6.7 GM/DL — SIGNIFICANT CHANGE UP (ref 6–8.3)
RBC # BLD: 4.11 M/UL — LOW (ref 4.2–5.8)
RBC # FLD: 12.1 % — SIGNIFICANT CHANGE UP (ref 10.3–14.5)
SODIUM SERPL-SCNC: 137 MMOL/L — SIGNIFICANT CHANGE UP (ref 135–145)
TSH SERPL-MCNC: 1.27 UU/ML — SIGNIFICANT CHANGE UP (ref 0.34–4.82)
WBC # BLD: 11.77 K/UL — HIGH (ref 3.8–10.5)
WBC # FLD AUTO: 11.77 K/UL — HIGH (ref 3.8–10.5)

## 2022-05-05 PROCEDURE — 99232 SBSQ HOSP IP/OBS MODERATE 35: CPT

## 2022-05-05 PROCEDURE — 93010 ELECTROCARDIOGRAM REPORT: CPT

## 2022-05-05 RX ORDER — ACETAMINOPHEN 500 MG
1000 TABLET ORAL ONCE
Refills: 0 | Status: COMPLETED | OUTPATIENT
Start: 2022-05-05 | End: 2022-05-05

## 2022-05-05 RX ORDER — POLYETHYLENE GLYCOL 3350 17 G/17G
17 POWDER, FOR SOLUTION ORAL AT BEDTIME
Refills: 0 | Status: DISCONTINUED | OUTPATIENT
Start: 2022-05-05 | End: 2022-05-08

## 2022-05-05 RX ORDER — ACETAMINOPHEN 500 MG
1000 TABLET ORAL ONCE
Refills: 0 | Status: COMPLETED | OUTPATIENT
Start: 2022-05-05 | End: 2022-05-06

## 2022-05-05 RX ORDER — CEFAZOLIN SODIUM 1 G
1000 VIAL (EA) INJECTION EVERY 8 HOURS
Refills: 0 | Status: DISCONTINUED | OUTPATIENT
Start: 2022-05-05 | End: 2022-05-08

## 2022-05-05 RX ORDER — PANTOPRAZOLE SODIUM 20 MG/1
40 TABLET, DELAYED RELEASE ORAL
Refills: 0 | Status: DISCONTINUED | OUTPATIENT
Start: 2022-05-05 | End: 2022-05-08

## 2022-05-05 RX ORDER — CEFAZOLIN SODIUM 1 G
1000 VIAL (EA) INJECTION EVERY 8 HOURS
Refills: 0 | Status: DISCONTINUED | OUTPATIENT
Start: 2022-05-05 | End: 2022-05-05

## 2022-05-05 RX ORDER — ACETAMINOPHEN 500 MG
1000 TABLET ORAL ONCE
Refills: 0 | Status: DISCONTINUED | OUTPATIENT
Start: 2022-05-05 | End: 2022-05-08

## 2022-05-05 RX ORDER — LEVETIRACETAM 250 MG/1
750 TABLET, FILM COATED ORAL
Refills: 0 | Status: DISCONTINUED | OUTPATIENT
Start: 2022-05-05 | End: 2022-05-08

## 2022-05-05 RX ORDER — TAMSULOSIN HYDROCHLORIDE 0.4 MG/1
0.4 CAPSULE ORAL
Refills: 0 | Status: DISCONTINUED | OUTPATIENT
Start: 2022-05-05 | End: 2022-05-08

## 2022-05-05 RX ADMIN — Medication 1000 MILLIGRAM(S): at 13:00

## 2022-05-05 RX ADMIN — SODIUM CHLORIDE 75 MILLILITER(S): 9 INJECTION INTRAMUSCULAR; INTRAVENOUS; SUBCUTANEOUS at 12:22

## 2022-05-05 RX ADMIN — OXYCODONE HYDROCHLORIDE 5 MILLIGRAM(S): 5 TABLET ORAL at 05:31

## 2022-05-05 RX ADMIN — TAMSULOSIN HYDROCHLORIDE 0.4 MILLIGRAM(S): 0.4 CAPSULE ORAL at 10:39

## 2022-05-05 RX ADMIN — HYDROMORPHONE HYDROCHLORIDE 1 MILLIGRAM(S): 2 INJECTION INTRAMUSCULAR; INTRAVENOUS; SUBCUTANEOUS at 22:00

## 2022-05-05 RX ADMIN — OXYCODONE HYDROCHLORIDE 5 MILLIGRAM(S): 5 TABLET ORAL at 06:30

## 2022-05-05 RX ADMIN — OXYCODONE HYDROCHLORIDE 5 MILLIGRAM(S): 5 TABLET ORAL at 00:00

## 2022-05-05 RX ADMIN — Medication 1000 MILLIGRAM(S): at 21:29

## 2022-05-05 RX ADMIN — Medication 1000 MILLIGRAM(S): at 13:36

## 2022-05-05 RX ADMIN — OXYCODONE HYDROCHLORIDE 5 MILLIGRAM(S): 5 TABLET ORAL at 13:01

## 2022-05-05 RX ADMIN — Medication 100 MILLIGRAM(S): at 10:41

## 2022-05-05 RX ADMIN — Medication 1 MILLIGRAM(S): at 10:39

## 2022-05-05 RX ADMIN — LEVETIRACETAM 750 MILLIGRAM(S): 250 TABLET, FILM COATED ORAL at 10:41

## 2022-05-05 RX ADMIN — TAMSULOSIN HYDROCHLORIDE 0.4 MILLIGRAM(S): 0.4 CAPSULE ORAL at 21:31

## 2022-05-05 RX ADMIN — HYDROMORPHONE HYDROCHLORIDE 1 MILLIGRAM(S): 2 INJECTION INTRAMUSCULAR; INTRAVENOUS; SUBCUTANEOUS at 21:47

## 2022-05-05 RX ADMIN — SENNA PLUS 2 TABLET(S): 8.6 TABLET ORAL at 21:29

## 2022-05-05 RX ADMIN — PANTOPRAZOLE SODIUM 40 MILLIGRAM(S): 20 TABLET, DELAYED RELEASE ORAL at 10:41

## 2022-05-05 RX ADMIN — POLYETHYLENE GLYCOL 3350 17 GRAM(S): 17 POWDER, FOR SOLUTION ORAL at 21:29

## 2022-05-05 RX ADMIN — Medication 1 TABLET(S): at 10:41

## 2022-05-05 RX ADMIN — Medication 400 MILLIGRAM(S): at 16:19

## 2022-05-05 RX ADMIN — Medication 1000 MILLIGRAM(S): at 17:19

## 2022-05-05 RX ADMIN — OXYCODONE HYDROCHLORIDE 5 MILLIGRAM(S): 5 TABLET ORAL at 12:22

## 2022-05-05 RX ADMIN — HYDROMORPHONE HYDROCHLORIDE 1 MILLIGRAM(S): 2 INJECTION INTRAMUSCULAR; INTRAVENOUS; SUBCUTANEOUS at 03:35

## 2022-05-05 RX ADMIN — OXYCODONE HYDROCHLORIDE 5 MILLIGRAM(S): 5 TABLET ORAL at 20:30

## 2022-05-05 RX ADMIN — Medication 400 MILLIGRAM(S): at 09:22

## 2022-05-05 RX ADMIN — OXYCODONE HYDROCHLORIDE 5 MILLIGRAM(S): 5 TABLET ORAL at 19:40

## 2022-05-05 RX ADMIN — LEVETIRACETAM 750 MILLIGRAM(S): 250 TABLET, FILM COATED ORAL at 21:29

## 2022-05-05 RX ADMIN — HYDROMORPHONE HYDROCHLORIDE 1 MILLIGRAM(S): 2 INJECTION INTRAMUSCULAR; INTRAVENOUS; SUBCUTANEOUS at 03:21

## 2022-05-05 NOTE — PHYSICAL THERAPY INITIAL EVALUATION ADULT - ACTIVE RANGE OF MOTION EXAMINATION, REHAB EVAL
except B shld elev ~90 (pt declining further d/t monitors/"stuff")/bilateral upper extremity Active ROM was WFL (within functional limits)/bilateral  lower extremity Active ROM was WFL (within functional limits)

## 2022-05-05 NOTE — PROGRESS NOTE ADULT - ASSESSMENT
Patient has an estimated Caprini Score of greater than 5.  However, the patient's unique clinical situation will be addressed in an individual manner to determine appropriate anticoagulation treatment, if any.  At this time no chemical DVT prophylaxis. Mechancial DVT prophylaxis is permitted.  63 y/o male w/ a PMHx of BPH presents to the ED from [facility] for abnormal MRI showing bilateral SDH.  Pt reports he was at  ED s/p syncopal episode, + fall, +head truama, +LOC on 2/26/22 and again in 4/12/22.  Pt seen by Dr. Morfin for worsening complaints of frontal HA, nausea, and lack of movement in b/l arms. Pt also c/o right hand/arm numbness. Pt was seen at a concussion center with Dr. Morfin and was referred to [facility] for an MRI which he had today and was found to have b/l brain bleeds. Pt endorses taking Advil 4 x a day w/ moderate improvement. Denies vison or speech changes. Denies incontinence, dizziness, or SOB, or CP. Pt able to ambulate but is unsteady.     Problem List  - SDH bilateral   - Brain compression  - BPH    s/p b/l jose holes and evacuation of SDH     PLAN-   Advance diet and activity as tolerated   Change neuro checks to Q2 hours   IV Tylenol for pain   Continue to monitor PAULINO output   CT later today   Continue Keppra for seizure PPX   Venodynes for DVT PPX   Maintain SBP < 150     Discussed with Dr. Morfin

## 2022-05-05 NOTE — PHYSICAL THERAPY INITIAL EVALUATION ADULT - PERTINENT HX OF CURRENT PROBLEM, REHAB EVAL
62y old  M presents for abnormal MRI showing bilateral subdural hematomas. Had prior adm on 2/26 and 4/12 s/p syncopal episode, + fall, +head truama, +LOC. Pt had worsening complaints of frontal HA, nausea, and lack of movement in b/l arms. Pt able to ambulate but is unsteady. Currently post-op B/L frontal jose holes with decompression. Subdural PAULINO drains placed 62y old  M presents to  d/t abnormal MRI showing bilateral subdural hematomas. Pt to ED on 2/26 d/t syncopal episode, fall -CTH neg. Pt returned to ED 4/12 d/t frontal HA, nausea, and lack of movement in b/l arms. Pt able to ambulate but is unsteady. Pt referred to post-concussive synd. clinic, seen by Dr. Morfin. MRI ordered, showed B SDH. Currently post-op B/L frontal jose holes with decompression. Subdural PAULINO drains placed

## 2022-05-05 NOTE — PROGRESS NOTE ADULT - ASSESSMENT
IMP:    63 y/o male with BPH admitted with B/L SDH S/P evacuation POD # 1  Acceptable BP    Suggest:    Pain control  IS  Neuro checks  Cont with tamsulosin  FS OK  DVT prophy--SCD    ICU care-- d/w ICU staff on multi disciplinary rounds and pt-- All concerns addressed including but not limited to diagnosis, treatment plan and overall prognosis

## 2022-05-06 PROCEDURE — 70450 CT HEAD/BRAIN W/O DYE: CPT | Mod: 26

## 2022-05-06 PROCEDURE — 99232 SBSQ HOSP IP/OBS MODERATE 35: CPT

## 2022-05-06 RX ADMIN — TAMSULOSIN HYDROCHLORIDE 0.4 MILLIGRAM(S): 0.4 CAPSULE ORAL at 22:24

## 2022-05-06 RX ADMIN — PANTOPRAZOLE SODIUM 40 MILLIGRAM(S): 20 TABLET, DELAYED RELEASE ORAL at 06:02

## 2022-05-06 RX ADMIN — HYDROMORPHONE HYDROCHLORIDE 1 MILLIGRAM(S): 2 INJECTION INTRAMUSCULAR; INTRAVENOUS; SUBCUTANEOUS at 14:59

## 2022-05-06 RX ADMIN — SODIUM CHLORIDE 75 MILLILITER(S): 9 INJECTION INTRAMUSCULAR; INTRAVENOUS; SUBCUTANEOUS at 00:53

## 2022-05-06 RX ADMIN — HYDROMORPHONE HYDROCHLORIDE 1 MILLIGRAM(S): 2 INJECTION INTRAMUSCULAR; INTRAVENOUS; SUBCUTANEOUS at 20:30

## 2022-05-06 RX ADMIN — OXYCODONE HYDROCHLORIDE 10 MILLIGRAM(S): 5 TABLET ORAL at 02:00

## 2022-05-06 RX ADMIN — LEVETIRACETAM 750 MILLIGRAM(S): 250 TABLET, FILM COATED ORAL at 09:32

## 2022-05-06 RX ADMIN — Medication 1 MILLIGRAM(S): at 09:33

## 2022-05-06 RX ADMIN — Medication 100 MILLIGRAM(S): at 09:33

## 2022-05-06 RX ADMIN — Medication 1000 MILLIGRAM(S): at 22:24

## 2022-05-06 RX ADMIN — Medication 400 MILLIGRAM(S): at 12:22

## 2022-05-06 RX ADMIN — TAMSULOSIN HYDROCHLORIDE 0.4 MILLIGRAM(S): 0.4 CAPSULE ORAL at 09:33

## 2022-05-06 RX ADMIN — Medication 1000 MILLIGRAM(S): at 06:02

## 2022-05-06 RX ADMIN — Medication 1000 MILLIGRAM(S): at 13:15

## 2022-05-06 RX ADMIN — LEVETIRACETAM 750 MILLIGRAM(S): 250 TABLET, FILM COATED ORAL at 22:24

## 2022-05-06 RX ADMIN — POLYETHYLENE GLYCOL 3350 17 GRAM(S): 17 POWDER, FOR SOLUTION ORAL at 22:24

## 2022-05-06 RX ADMIN — Medication 1 TABLET(S): at 09:32

## 2022-05-06 RX ADMIN — OXYCODONE HYDROCHLORIDE 10 MILLIGRAM(S): 5 TABLET ORAL at 00:58

## 2022-05-06 RX ADMIN — HYDROMORPHONE HYDROCHLORIDE 1 MILLIGRAM(S): 2 INJECTION INTRAMUSCULAR; INTRAVENOUS; SUBCUTANEOUS at 20:00

## 2022-05-06 RX ADMIN — HYDROMORPHONE HYDROCHLORIDE 1 MILLIGRAM(S): 2 INJECTION INTRAMUSCULAR; INTRAVENOUS; SUBCUTANEOUS at 14:03

## 2022-05-06 RX ADMIN — Medication 1000 MILLIGRAM(S): at 14:44

## 2022-05-06 NOTE — PROGRESS NOTE ADULT - ASSESSMENT
Patient has an estimated Caprini Score of greater than 5.  However, the patient's unique clinical situation will be addressed in an individual manner to determine appropriate anticoagulation treatment, if any.  At this time no chemical DVT prophylaxis. Mechancial DVT prophylaxis is permitted.  61 y/o male w/ a PMHx of BPH presents to the ED from [facility] for abnormal MRI showing bilateral SDH.  Pt reports he was at  ED s/p syncopal episode, + fall, +head truama, +LOC on 2/26/22 and again in 4/12/22.  Pt seen by Dr. Morfin for worsening complaints of frontal HA, nausea, and lack of movement in b/l arms. Pt also c/o right hand/arm numbness. Pt was seen at a concussion center with Dr. Morfin and was referred to [facility] for an MRI which he had today and was found to have b/l brain bleeds. Pt endorses taking Advil 4 x a day w/ moderate improvement. Denies vison or speech changes. Denies incontinence, dizziness, or SOB, or CP. Pt able to ambulate but is unsteady.     Problem List  - SDH bilateral   - Brain compression  - BPH    s/p b/l jose holes and evacuation of SDH     PLAN-   Advance diet and activity as tolerated   Tylneol, oxycodone as needed for pain  Continue to monitor PAULINO output   Right PAULINO removed today catheter intact upon removal  Head CT tomorrow AM  Continue Keppra for seizure PPX   Venodynes for DVT PPX   Maintain SBP < 150     Discussed with Dr. Morfin Patient has an estimated Caprini Score of greater than 5.  However, the patient's unique clinical situation will be addressed in an individual manner to determine appropriate anticoagulation treatment, if any.  At this time no chemical DVT prophylaxis. Mechancial DVT prophylaxis is permitted.  63 y/o male w/ a PMHx of BPH presents to the ED from [facility] for abnormal MRI showing bilateral SDH.  Pt reports he was at  ED s/p syncopal episode, + fall, +head truama, +LOC on 2/26/22 and again in 4/12/22.  Pt seen by Dr. Morfin for worsening complaints of frontal HA, nausea, and lack of movement in b/l arms. Pt also c/o right hand/arm numbness. Pt was seen at a concussion center with Dr. Morfin and was referred to [facility] for an MRI which he had today and was found to have b/l brain bleeds. Pt endorses taking Advil 4 x a day w/ moderate improvement. Denies vison or speech changes. Denies incontinence, dizziness, or SOB, or CP. Pt able to ambulate but is unsteady.     Problem List  - SDH bilateral   - Brain compression  - BPH    POD#2 s/p b/l jose holes and evacuation of SDH     PLAN-   Advance diet and activity as tolerated   Tylneol, oxycodone as needed for pain  Continue to monitor PAULINO output   Right PAULINO removed today catheter intact upon removal  L PAULINO drain to thumbprint [id number]  Head CT tomorrow AM  Continue Keppra for seizure PPX   Venodynes for DVT PPX   Maintain SBP < 150     Discussed with Dr. Morfin

## 2022-05-06 NOTE — DIETITIAN INITIAL EVALUATION ADULT - ORAL INTAKE PTA/DIET HISTORY
allergic to shrimp and lobster, itchiness and breathing difficulties.  takes MVI.  eats 2-3 meals/day (1/2 size meals).

## 2022-05-06 NOTE — DIETITIAN INITIAL EVALUATION ADULT - PERTINENT MEDS FT
MEDICATIONS  (STANDING):  ceFAZolin   IVPB 2000 milliGRAM(s) IV Intermittent once  ceFAZolin  Injectable. 1000 milliGRAM(s) IV Push every 8 hours  folic acid 1 milliGRAM(s) Oral daily  levETIRAcetam 750 milliGRAM(s) Oral two times a day  multivitamin 1 Tablet(s) Oral daily  pantoprazole    Tablet 40 milliGRAM(s) Oral before breakfast  polyethylene glycol 3350 17 Gram(s) Oral at bedtime  tamsulosin 0.4 milliGRAM(s) Oral two times a day  thiamine 100 milliGRAM(s) Oral daily    MEDICATIONS  (PRN):  acetaminophen   IVPB .. 1000 milliGRAM(s) IV Intermittent once PRN Moderate Pain (4 - 6)  acetaminophen   IVPB .. 1000 milliGRAM(s) IV Intermittent once PRN Moderate Pain (4 - 6)  HYDROmorphone  Injectable 1 milliGRAM(s) IV Push every 4 hours PRN Severe Pain (7 - 10)  labetalol Injectable 10 milliGRAM(s) IV Push every 1 hour PRN sbp > 150  ondansetron Injectable 4 milliGRAM(s) IV Push every 6 hours PRN Nausea and/or Vomiting  oxyCODONE    IR 5 milliGRAM(s) Oral every 4 hours PRN Moderate Pain (4 - 6)  oxyCODONE    IR 10 milliGRAM(s) Oral every 4 hours PRN Severe Pain (7 - 10)  senna 2 Tablet(s) Oral at bedtime PRN Constipation

## 2022-05-06 NOTE — DIETITIAN NUTRITION RISK NOTIFICATION - ADDITIONAL COMMENTS/DIETITIAN RECOMMENDATIONS
1) add gelatein BID to optimize protein intake 2) consider checking vitamin D level and supplement prn 3) monitor BM; if > 3 days without BM, add bowel regimen 4) daily wt checks to track/trend changes

## 2022-05-06 NOTE — PROGRESS NOTE ADULT - ASSESSMENT
IMP:    63 y/o male with BPH admitted with B/L SDH S/P evacuation POD # 2  Acceptable BP    Doing well Post op    Suggest:    DC IVF  Stop FS  Pain control  IS  Neuro checks  Cont with tamsulosin  DVT prophy--SCD    ICU care-- d/w ICU staff on multi disciplinary rounds and pt-- All concerns addressed including but not limited to diagnosis, treatment plan and overall prognosis

## 2022-05-06 NOTE — DIETITIAN INITIAL EVALUATION ADULT - PERTINENT LABORATORY DATA
05-05    137  |  108  |  20  ----------------------------<  150<H>  4.5   |  26  |  0.83    Ca    8.9      05 May 2022 05:56  Phos  3.5     05-05  Mg     2.2     05-05    TPro  6.7  /  Alb  3.3  /  TBili  0.5  /  DBili  x   /  AST  9<L>  /  ALT  15  /  AlkPhos  100  05-05  POCT Blood Glucose.: 94 mg/dL (05-05-22 @ 16:58)  A1C with Estimated Average Glucose Result: 5.9 % (05-05-22 @ 05:56)  A1C with Estimated Average Glucose Result: 5.8 % (05-04-22 @ 17:28)

## 2022-05-06 NOTE — DIETITIAN INITIAL EVALUATION ADULT - FUNCTIONAL SCREEN CURRENT LEVEL: SWALLOWING (IF SCORE 2 OR MORE FOR ANY ITEM, CONSULT REHAB SERVICES), MLM)
0 = swallows foods/liquids without difficulty 67 y/o M with PMH of PAF, NICM, VFIB/SVT(s/p AICD) presented with the complaint of SOB x 1 day. R/o ACS vs CHF exacerbation.

## 2022-05-06 NOTE — DIETITIAN INITIAL EVALUATION ADULT - REASON FOR ADMISSION
Traumatic subdural hemorrhage with loss of consciousness of unspecified duration, initial encounter

## 2022-05-06 NOTE — DIETITIAN INITIAL EVALUATION ADULT - OTHER INFO
61yo male with PMH significant for BPH presented s/p syncopal episode with fall and head trauma.  admitted for b/l subdural hematomas, brain compression s/p creation of jose hole.

## 2022-05-07 PROCEDURE — 99232 SBSQ HOSP IP/OBS MODERATE 35: CPT

## 2022-05-07 PROCEDURE — 70450 CT HEAD/BRAIN W/O DYE: CPT | Mod: 26

## 2022-05-07 RX ADMIN — OXYCODONE HYDROCHLORIDE 10 MILLIGRAM(S): 5 TABLET ORAL at 19:57

## 2022-05-07 RX ADMIN — SENNA PLUS 2 TABLET(S): 8.6 TABLET ORAL at 21:10

## 2022-05-07 RX ADMIN — POLYETHYLENE GLYCOL 3350 17 GRAM(S): 17 POWDER, FOR SOLUTION ORAL at 21:10

## 2022-05-07 RX ADMIN — TAMSULOSIN HYDROCHLORIDE 0.4 MILLIGRAM(S): 0.4 CAPSULE ORAL at 11:41

## 2022-05-07 RX ADMIN — OXYCODONE HYDROCHLORIDE 5 MILLIGRAM(S): 5 TABLET ORAL at 13:29

## 2022-05-07 RX ADMIN — TAMSULOSIN HYDROCHLORIDE 0.4 MILLIGRAM(S): 0.4 CAPSULE ORAL at 21:10

## 2022-05-07 RX ADMIN — Medication 1000 MILLIGRAM(S): at 06:09

## 2022-05-07 RX ADMIN — LEVETIRACETAM 750 MILLIGRAM(S): 250 TABLET, FILM COATED ORAL at 21:10

## 2022-05-07 RX ADMIN — HYDROMORPHONE HYDROCHLORIDE 1 MILLIGRAM(S): 2 INJECTION INTRAMUSCULAR; INTRAVENOUS; SUBCUTANEOUS at 09:31

## 2022-05-07 RX ADMIN — Medication 100 MILLIGRAM(S): at 11:40

## 2022-05-07 RX ADMIN — Medication 1000 MILLIGRAM(S): at 13:29

## 2022-05-07 RX ADMIN — PANTOPRAZOLE SODIUM 40 MILLIGRAM(S): 20 TABLET, DELAYED RELEASE ORAL at 11:40

## 2022-05-07 RX ADMIN — HYDROMORPHONE HYDROCHLORIDE 1 MILLIGRAM(S): 2 INJECTION INTRAMUSCULAR; INTRAVENOUS; SUBCUTANEOUS at 18:31

## 2022-05-07 RX ADMIN — HYDROMORPHONE HYDROCHLORIDE 1 MILLIGRAM(S): 2 INJECTION INTRAMUSCULAR; INTRAVENOUS; SUBCUTANEOUS at 09:45

## 2022-05-07 RX ADMIN — Medication 1000 MILLIGRAM(S): at 21:10

## 2022-05-07 RX ADMIN — OXYCODONE HYDROCHLORIDE 5 MILLIGRAM(S): 5 TABLET ORAL at 14:00

## 2022-05-07 RX ADMIN — LEVETIRACETAM 750 MILLIGRAM(S): 250 TABLET, FILM COATED ORAL at 11:40

## 2022-05-07 RX ADMIN — HYDROMORPHONE HYDROCHLORIDE 1 MILLIGRAM(S): 2 INJECTION INTRAMUSCULAR; INTRAVENOUS; SUBCUTANEOUS at 01:09

## 2022-05-07 RX ADMIN — Medication 1 TABLET(S): at 11:40

## 2022-05-07 RX ADMIN — Medication 1 MILLIGRAM(S): at 11:40

## 2022-05-07 RX ADMIN — HYDROMORPHONE HYDROCHLORIDE 1 MILLIGRAM(S): 2 INJECTION INTRAMUSCULAR; INTRAVENOUS; SUBCUTANEOUS at 19:01

## 2022-05-07 RX ADMIN — OXYCODONE HYDROCHLORIDE 10 MILLIGRAM(S): 5 TABLET ORAL at 19:33

## 2022-05-07 RX ADMIN — HYDROMORPHONE HYDROCHLORIDE 1 MILLIGRAM(S): 2 INJECTION INTRAMUSCULAR; INTRAVENOUS; SUBCUTANEOUS at 00:39

## 2022-05-07 NOTE — PROGRESS NOTE ADULT - NUTRITIONAL ASSESSMENT
This patient has been assessed with a concern for Malnutrition and has been determined to have a diagnosis/diagnoses of Moderate protein-calorie malnutrition.    This patient is being managed with:   Diet Regular-  Entered: May  5 2022  1:13AM    
This patient has been assessed with a concern for Malnutrition and has been determined to have a diagnosis/diagnoses of Moderate protein-calorie malnutrition.    This patient is being managed with:   Diet Regular-  Entered: May  5 2022  1:13AM

## 2022-05-07 NOTE — PROGRESS NOTE ADULT - ASSESSMENT
61 y/o male w/ a PMHx of BPH presents to the ED from [facility] for abnormal MRI showing bilateral SDH.  Pt reports he was at  ED s/p syncopal episode, + fall, +head truama, +LOC on 2/26/22 and again in 4/12/22.  Pt seen by Dr. Morfin for worsening complaints of frontal HA, nausea, and lack of movement in b/l arms. Pt also c/o right hand/arm numbness. Pt was seen at a concussion center with Dr. Morfin and was referred to [facility] for an MRI which he had today and was found to have b/l brain bleeds. Pt endorses taking Advil 4 x a day w/ moderate improvement. Denies vison or speech changes. Denies incontinence, dizziness, or SOB, or CP. Pt able to ambulate but is unsteady.     Problem List  - SDH bilateral   - Brain compression  - BPH    POD#3 s/p b/l jose holes and evacuation of SDH     PLAN-   Advance diet and activity as tolerated   Tylneol, oxycodone as needed for pain  Head Ct this Am stable  L PAULINO drain dc today  - PT/OOB  Continue Keppra for seizure PPX   Venodynes for DVT PPX   Maintain SBP < 150   Dc home tomorrow     Discussed with Dr. Morfin

## 2022-05-07 NOTE — PROGRESS NOTE ADULT - ASSESSMENT
IMP:    63 y/o male with BPH admitted with B/L SDH S/P evacuation POD # 3  Acceptable BP    Doing well Post op    Suggest:    Drain removal as per NSGY  Pain control  IS  Neuro checks  Cont with tamsulosin  DVT prophy--SCD    ICU care-- d/w ICU staff on multi disciplinary rounds and pt-- All concerns addressed including but not limited to diagnosis, treatment plan and overall prognosis

## 2022-05-08 ENCOUNTER — TRANSCRIPTION ENCOUNTER (OUTPATIENT)
Age: 62
End: 2022-05-08

## 2022-05-08 VITALS
OXYGEN SATURATION: 98 % | HEART RATE: 76 BPM | SYSTOLIC BLOOD PRESSURE: 113 MMHG | DIASTOLIC BLOOD PRESSURE: 69 MMHG | RESPIRATION RATE: 12 BRPM

## 2022-05-08 PROCEDURE — 99232 SBSQ HOSP IP/OBS MODERATE 35: CPT

## 2022-05-08 RX ORDER — SENNA PLUS 8.6 MG/1
2 TABLET ORAL
Qty: 0 | Refills: 0 | DISCHARGE
Start: 2022-05-08

## 2022-05-08 RX ORDER — IBUPROFEN 200 MG
1 TABLET ORAL
Qty: 0 | Refills: 0 | DISCHARGE

## 2022-05-08 RX ORDER — OXYCODONE HYDROCHLORIDE 5 MG/1
1 TABLET ORAL
Qty: 40 | Refills: 0
Start: 2022-05-08 | End: 2022-05-14

## 2022-05-08 RX ORDER — POLYETHYLENE GLYCOL 3350 17 G/17G
17 POWDER, FOR SOLUTION ORAL
Qty: 0 | Refills: 0 | DISCHARGE
Start: 2022-05-08

## 2022-05-08 RX ORDER — LEVETIRACETAM 250 MG/1
1 TABLET, FILM COATED ORAL
Qty: 60 | Refills: 0
Start: 2022-05-08 | End: 2022-06-06

## 2022-05-08 RX ORDER — ACETAMINOPHEN 500 MG
1000 TABLET ORAL ONCE
Refills: 0 | Status: COMPLETED | OUTPATIENT
Start: 2022-05-08 | End: 2022-05-08

## 2022-05-08 RX ADMIN — Medication 1000 MILLIGRAM(S): at 06:31

## 2022-05-08 RX ADMIN — Medication 400 MILLIGRAM(S): at 05:39

## 2022-05-08 RX ADMIN — LEVETIRACETAM 750 MILLIGRAM(S): 250 TABLET, FILM COATED ORAL at 09:07

## 2022-05-08 RX ADMIN — PANTOPRAZOLE SODIUM 40 MILLIGRAM(S): 20 TABLET, DELAYED RELEASE ORAL at 09:07

## 2022-05-08 RX ADMIN — Medication 1 TABLET(S): at 09:08

## 2022-05-08 RX ADMIN — TAMSULOSIN HYDROCHLORIDE 0.4 MILLIGRAM(S): 0.4 CAPSULE ORAL at 09:07

## 2022-05-08 RX ADMIN — Medication 1000 MILLIGRAM(S): at 05:40

## 2022-05-08 NOTE — DISCHARGE NOTE NURSING/CASE MANAGEMENT/SOCIAL WORK - PATIENT PORTAL LINK FT
You can access the FollowMyHealth Patient Portal offered by [facility] by registering at the following website: [email or web address]. By joining [employer]’s FollowMyHealth portal, you will also be able to view your health information using other applications (apps) compatible with our system.

## 2022-05-08 NOTE — PROGRESS NOTE ADULT - GASTROINTESTINAL DETAILS
soft/nontender/no guarding/no rigidity
soft/nontender/no guarding/no rigidity
soft/nontender
soft/nontender/no guarding/no rigidity

## 2022-05-08 NOTE — PROGRESS NOTE ADULT - ASSESSMENT
IMP:    63 y/o male with BPH admitted with B/L SDH S/P evacuation POD # 4  Acceptable BP    Doing well Post op    Suggest:    Pain control  IS  Cont with tamsulosin  DVT prophy--SCD  OOB    Maybe DC home-- d/w ICU staff on multi disciplinary rounds

## 2022-05-08 NOTE — DISCHARGE NOTE NURSING/CASE MANAGEMENT/SOCIAL WORK - NSDCPEFALRISK_GEN_ALL_CORE
For information on Fall & Injury Prevention, visit: [email or web address] OR  [email or web address] OR  https://www.cdc.gov/steadi/patient.html

## 2022-05-08 NOTE — DISCHARGE NOTE NURSING/CASE MANAGEMENT/SOCIAL WORK - NSDCVIVACCINE_GEN_ALL_CORE_FT
Tdap; 26-Feb-2022 08:06; Roya Jason (RN); [employer]; [id number] (Exp. Date: 23-Sep-2023); IntraMuscular; Deltoid Left.; 0.5 milliLiter(s); VIS (VIS Published: 09-May-2013, VIS Presented: 26-Feb-2022);

## 2022-05-08 NOTE — DISCHARGE NOTE PROVIDER - HOSPITAL COURSE
61 y/o male w/ a PMHx of BPH presents to the ED from [facility] for abnormal MRI showing bilateral SDH.  Pt reports he was at  ED s/p syncopal episode, + fall, +head truama, +LOC on 2/26/22 and again in 4/12/22.  Pt seen by Dr. Morfin for worsening complaints of frontal HA, nausea, and lack of movement in b/l arms. Pt also c/o right hand/arm numbness. Pt was seen at a concussion center with Dr. Morfin and was referred [facility]  for an MRI which he had today and was found to have b/l brain bleeds. Pt endorses taking Advil 4 x a day w/ moderate improvement. Denies vison or speech changes. Denies incontinence, dizziness, or SOB, or CP. Pt able to ambulate but is unsteady.     5/5- POD #1, s/p jose hole and evacuation of SDH. Pt doing well, no events overnight. Pt c/o headache. PAULINO drains in place and functional. Post-op ct showed small residual SDHs.     5/6- POD#2 Patient seen and examined on AM rounds with Dr. Morfin. No acute events overnight. Right PAULINO drain with minimal output. Head CT this AM reviewed. Complains of headache this AM otherwise denies numb/tingling, weakness, n/v.    5/7- POD#3 Patient seen and examined, no acute events overnight. HCt reviewed by Dr. Morfin. Complains of headache unchanged from yesterday. He denies n/v, numb/tingling, weakness.    5/8- POD#4 Patient seen and examined, left PAULINO subdural drain removed yesterday closed with staple. Patient is voiding, tolerating didet. Ambulating with PT rec discharge home. Patient is stable from neurosurgical standpoint for discharge.

## 2022-05-08 NOTE — DISCHARGE NOTE PROVIDER - NSDCCPCAREPLAN_GEN_ALL_CORE_FT
PRINCIPAL DISCHARGE DIAGNOSIS  Diagnosis: SDH (subdural hematoma)  Assessment and Plan of Treatment: Follow up with Dr. Morfin in 2 weeks with repeat Head CT scan. Call the office, or visit the nearest ER if you experience redness, swelling, drainage from your wound, fevers >101.3F, severe headache not relieved by medication, nausea/vomiting, lethargy. Continue activity as tolerated. You may shower, wash hair with baby shampoo, rinse and pat dry. No strenuous activity, no heavy lifting >10lbs. Do not drive. No NSAIDs such as ibuprofen or naproxen, no aspirin. You may take Tylenol extra strength as directed and oxycodone as needed.

## 2022-05-08 NOTE — PROGRESS NOTE ADULT - REASON FOR ADMISSION
bilateral subdural hematomas

## 2022-05-08 NOTE — DISCHARGE NOTE PROVIDER - NSDCMRMEDTOKEN_GEN_ALL_CORE_FT
levETIRAcetam 750 mg oral tablet: 1 tab(s) orally 2 times a day  oxyCODONE 5 mg oral tablet: 1-2 tab(s) orally every 4 to 6 hours, As Needed - for severe pain MDD:6  polyethylene glycol 3350 oral powder for reconstitution: 17 gram(s) orally once a day (at bedtime)  senna oral tablet: 2 tab(s) orally once a day (at bedtime), As needed, Constipation  tamsulosin 0.4 mg oral capsule: 1 cap(s) orally 2 times a day

## 2022-05-08 NOTE — PROGRESS NOTE ADULT - NEUROLOGICAL DETAILS
Grossly non focal/alert and oriented x 3
Grossly non focal/alert and oriented x 3
Grossly non focal
Grossly non focal/alert and oriented x 3

## 2022-05-08 NOTE — PROGRESS NOTE ADULT - RS GEN PE MLT RESP DETAILS PC
airway patent/breath sounds equal

## 2022-05-08 NOTE — DISCHARGE NOTE PROVIDER - NSDCFUSCHEDAPPT_GEN_ALL_CORE_FT
[facility]  [facility]  NeuroSurg [address]  Scheduled Appointment: 05/23/2022

## 2022-05-08 NOTE — PROGRESS NOTE ADULT - SUBJECTIVE AND OBJECTIVE BOX
Pt admitted under [facility] D # 3  ICU # 3    CC:  SDH    HPI:    61 y/o male with BPH presents to the ED from [facility] for abnormal MRI showing bilateral SDH.  Pt reports he was at  ED s/p syncopal episode, + fall, +head truama, +LOC on 2/26/22 and again in 4/12/22.  Pt seen by Dr. Morfin for worsening complaints of frontal HA, nausea, and lack of movement in b/l arms. Pt also c/o right hand/arm numbness. Pt was seen at a concussion center with Dr. Morfin and was referred to [facility] for an MRI which he had today and was found to have b/l brain bleeds. Pt endorses taking Advil 4 x a day w/ moderate improvement. Denies vison or speech changes. Denies incontinence, dizziness, or SOB, or CP. Pt able to ambulate but is unsteady.     5/5:  Pt seen and examined in ICU during IDR.  POD # 1 S/P evacuation.  Awake and alert. No fevers.  No gtts    5/6:  Pt seen and examined in ICU during IDR.  No events overnight.  POD # 2 S/P evacuation. Eating.  FS good. Tm 98.2   Doing well post op    5/7:  Pt seen and examined in ICU during IDR.  No events overnight.  Still has 1 drain.  Tm 98.2       PMH:  As above.     PSH:  As above.     FH: Non Contributory other than those listed in HPI    Social History:  NC    MEDICATIONS  (STANDING):  ceFAZolin   IVPB 2000 milliGRAM(s) IV Intermittent once  ceFAZolin  Injectable. 1000 milliGRAM(s) IV Push every 8 hours  folic acid 1 milliGRAM(s) Oral daily  levETIRAcetam 750 milliGRAM(s) Oral two times a day  multivitamin 1 Tablet(s) Oral daily  pantoprazole    Tablet 40 milliGRAM(s) Oral before breakfast  polyethylene glycol 3350 17 Gram(s) Oral at bedtime  tamsulosin 0.4 milliGRAM(s) Oral two times a day  thiamine 100 milliGRAM(s) Oral daily    MEDICATIONS  (PRN):  acetaminophen   IVPB .. 1000 milliGRAM(s) IV Intermittent once PRN Moderate Pain (4 - 6)  HYDROmorphone  Injectable 1 milliGRAM(s) IV Push every 4 hours PRN Severe Pain (7 - 10)  labetalol Injectable 10 milliGRAM(s) IV Push every 1 hour PRN sbp > 150  ondansetron Injectable 4 milliGRAM(s) IV Push every 6 hours PRN Nausea and/or Vomiting  oxyCODONE    IR 10 milliGRAM(s) Oral every 4 hours PRN Severe Pain (7 - 10)  oxyCODONE    IR 5 milliGRAM(s) Oral every 4 hours PRN Moderate Pain (4 - 6)  senna 2 Tablet(s) Oral at bedtime PRN Constipation      Allergies: NKDA    ROS:  SEE BELOW        ICU Vital Signs Last 24 Hrs  T(C): 37.1 (07 May 2022 06:00), Max: 37.1 (07 May 2022 06:00)  T(F): 98.7 (07 May 2022 06:00), Max: 98.7 (07 May 2022 06:00)  HR: 74 (07 May 2022 07:00) (65 - 99)  BP: 122/69 (07 May 2022 07:00) (104/73 - 133/62)  BP(mean): 81 (07 May 2022 07:00) (73 - 100)  ABP: --  ABP(mean): --  RR: 12 (07 May 2022 07:00) (7 - 22)  SpO2: 94% (07 May 2022 07:00) (94% - 99%)          I&O's Summary    06 May 2022 07:01  -  07 May 2022 07:00  --------------------------------------------------------  IN: 0 mL / OUT: 2130 mL / NET: -2130 mL        Physical Exam:  SEE BELOW                              DVT Prophylaxis:                                                            Contraindication:     Advanced Directives:    Discussed with:    Visit Information:  Time spent excluding procedure:      ** Time is exclusive of billed procedures and/or teaching and/or routine family updates.        
  Pt admitted under [facility] D # 2  ICU # 2    CC:  SDH    HPI:    63 y/o male with BPH presents to the ED from [facility] for abnormal MRI showing bilateral SDH.  Pt reports he was at  ED s/p syncopal episode, + fall, +head truama, +LOC on 22 and again in 22.  Pt seen by Dr. Morfin for worsening complaints of frontal HA, nausea, and lack of movement in b/l arms. Pt also c/o right hand/arm numbness. Pt was seen at a concussion center with Dr. Morfin and was referred to [facility] for an MRI which he had today and was found to have b/l brain bleeds. Pt endorses taking Advil 4 x a day w/ moderate improvement. Denies vison or speech changes. Denies incontinence, dizziness, or SOB, or CP. Pt able to ambulate but is unsteady.     5/5:  Pt seen and examined in ICU during IDR.  POD # 1 S/P evacuation.  Awake and alert. No fevers.  No gtts    5/6:  Pt seen and examined in ICU during IDR.  No events overnight.  POD # 2 S/P evacuation. Eating.  FS good. Tm 98.2   Doing well post op    PMH:  As above.     PSH:  As above.     FH: Non Contributory other than those listed in HPI    Social History:  NC    MEDICATIONS  (STANDING):  ceFAZolin   IVPB 2000 milliGRAM(s) IV Intermittent once  ceFAZolin  Injectable. 1000 milliGRAM(s) IV Push every 8 hours  dextrose 5%. 1000 milliLiter(s) (50 mL/Hr) IV Continuous <Continuous>  dextrose 50% Injectable 25 Gram(s) IV Push once  folic acid 1 milliGRAM(s) Oral daily  insulin lispro (ADMELOG) corrective regimen sliding scale   SubCutaneous four times a day before meals  levETIRAcetam 750 milliGRAM(s) Oral two times a day  multivitamin 1 Tablet(s) Oral daily  pantoprazole    Tablet 40 milliGRAM(s) Oral before breakfast  polyethylene glycol 3350 17 Gram(s) Oral at bedtime  sodium chloride 0.9%. 1000 milliLiter(s) (75 mL/Hr) IV Continuous <Continuous>  tamsulosin 0.4 milliGRAM(s) Oral two times a day  thiamine 100 milliGRAM(s) Oral daily    MEDICATIONS  (PRN):  acetaminophen   IVPB .. 1000 milliGRAM(s) IV Intermittent once PRN Moderate Pain (4 - 6)  acetaminophen   IVPB .. 1000 milliGRAM(s) IV Intermittent once PRN Moderate Pain (4 - 6)  dextrose Oral Gel 15 Gram(s) Oral once PRN Blood Glucose LESS THAN 70 milliGRAM(s)/deciliter  HYDROmorphone  Injectable 1 milliGRAM(s) IV Push every 4 hours PRN Severe Pain (7 - 10)  labetalol Injectable 10 milliGRAM(s) IV Push every 1 hour PRN sbp > 150  ondansetron Injectable 4 milliGRAM(s) IV Push every 6 hours PRN Nausea and/or Vomiting  oxyCODONE    IR 5 milliGRAM(s) Oral every 4 hours PRN Moderate Pain (4 - 6)  oxyCODONE    IR 10 milliGRAM(s) Oral every 4 hours PRN Severe Pain (7 - 10)  senna 2 Tablet(s) Oral at bedtime PRN Constipation      Allergies: NKDA    ROS:  SEE BELOW        ICU Vital Signs Last 24 Hrs  T(C): 36.6 (06 May 2022 05:00), Max: 36.8 (06 May 2022 01:00)  T(F): 97.9 (06 May 2022 05:00), Max: 98.2 (06 May 2022 01:00)  HR: 67 (06 May 2022 10:00) (56 - 84)  BP: 115/74 (06 May 2022 10:00) (95/59 - 126/90)  BP(mean): 82 (06 May 2022 10:00) (67 - 97)  ABP: --  ABP(mean): --  RR: 11 (06 May 2022 10:00) (9 - 21)  SpO2: 97% (06 May 2022 10:00) (94% - 99%)          I&O's Summary    05 May 2022 07:01  -  06 May 2022 07:00  --------------------------------------------------------  IN: 1764 mL / OUT: 1578 mL / NET: 186 mL        Physical Exam:  SEE BELOW                          13.0   11.77 )-----------( 248      ( 05 May 2022 05:56 )             38.9       05-    137  |  108  |  20  ----------------------------<  150<H>  4.5   |  26  |  0.83    Ca    8.9      05 May 2022 05:56  Phos  3.5     05-05  Mg     2.2     05-05    TPro  6.7  /  Alb  3.3  /  TBili  0.5  /  DBili  x   /  AST  9<L>  /  ALT  15  /  AlkPhos  100  05-05                Urinalysis Basic - ( 04 May 2022 19:37 )    Color: Yellow / Appearance: Clear / S.020 / pH: x  Gluc: x / Ketone: Negative  / Bili: Negative / Urobili: Negative   Blood: x / Protein: Negative / Nitrite: Negative   Leuk Esterase: Negative / RBC: x / WBC x   Sq Epi: x / Non Sq Epi: x / Bacteria: x        DVT Prophylaxis:                                                            Contraindication:     Advanced Directives:    Discussed with:    Visit Information:  Time spent excluding procedure:      ** Time is exclusive of billed procedures and/or teaching and/or routine family updates.        
HPI:  61 y/o male w/ a PMHx of BPH presents to the ED from [facility] for abnormal MRI showing bilateral SDH.  Pt reports he was at  ED s/p syncopal episode, + fall, +head truama, +LOC on 2/26/22 and again in 4/12/22.  Pt seen by Dr. Morfin for worsening complaints of frontal HA, nausea, and lack of movement in b/l arms. Pt also c/o right hand/arm numbness. Pt was seen at a concussion center with Dr. Morfin and was referred to [facility]  for an MRI which he had today and was found to have b/l brain bleeds. Pt endorses taking Advil 4 x a day w/ moderate improvement. Denies vison or speech changes. Denies incontinence, dizziness, or SOB, or CP. Pt able to ambulate but is unsteady.     5/5- POD #1, s/p jose hole and evacuation of SDH. Pt doing well, no events overnight. Pt c/o headache. PAULINO drains in place and functional. Post-op ct showed small residual SDHs.     5/6- POD#2 Patient seen and examined on AM rounds with Dr. Morfin. No acute events overnight. Right PAULINO drain with minimal output. Head CT this AM reviewed. Complains of headache this AM otherwise denies numb/tingling, weakness, n/v.    5/7- POD#3 Patient seen and examined, no acute events overnight. HCt reviewed by Dr. Morfin. Complains of headache unchanged from yesterday. He denies n/v, numb/tingling, weakness.    Vital Signs Last 24 Hrs  T(C): 37.1 (07 May 2022 06:00), Max: 37.1 (07 May 2022 06:00)  T(F): 98.7 (07 May 2022 06:00), Max: 98.7 (07 May 2022 06:00)  HR: 73 (07 May 2022 12:00) (65 - 99)  BP: 120/80 (07 May 2022 12:00) (111/64 - 129/73)  BP(mean): 89 (07 May 2022 12:00) (73 - 100)  RR: 13 (07 May 2022 12:00) (7 - 22)  SpO2: 99% (07 May 2022 12:00) (94% - 99%)    L PAULINO: 70cc overnight    MEDICATIONS  (STANDING):  ceFAZolin   IVPB 2000 milliGRAM(s) IV Intermittent once  ceFAZolin  Injectable. 1000 milliGRAM(s) IV Push every 8 hours  folic acid 1 milliGRAM(s) Oral daily  levETIRAcetam 750 milliGRAM(s) Oral two times a day  multivitamin 1 Tablet(s) Oral daily  pantoprazole    Tablet 40 milliGRAM(s) Oral before breakfast  polyethylene glycol 3350 17 Gram(s) Oral at bedtime  tamsulosin 0.4 milliGRAM(s) Oral two times a day  thiamine 100 milliGRAM(s) Oral daily    MEDICATIONS  (PRN):  acetaminophen   IVPB .. 1000 milliGRAM(s) IV Intermittent once PRN Moderate Pain (4 - 6)  HYDROmorphone  Injectable 1 milliGRAM(s) IV Push every 4 hours PRN Severe Pain (7 - 10)  labetalol Injectable 10 milliGRAM(s) IV Push every 1 hour PRN sbp > 150  ondansetron Injectable 4 milliGRAM(s) IV Push every 6 hours PRN Nausea and/or Vomiting  oxyCODONE    IR 10 milliGRAM(s) Oral every 4 hours PRN Severe Pain (7 - 10)  oxyCODONE    IR 5 milliGRAM(s) Oral every 4 hours PRN Moderate Pain (4 - 6)  senna 2 Tablet(s) Oral at bedtime PRN Constipation      PHYSICAL EXAM:  Constitutional: awake/alert  HEENT: PERRLA, EOMI, staples c/d/i  R PAULINO drain removed closed with staples  L PAULINO drain draining clear serous fluid  Neck: Supple  Respiratory: Breath sounds are clear bilaterally  Cardiovascular: S1 and S2, regular rhythm  Gastrointestinal: Soft, NT/ND  Extremities:  no edema  Vascular: No carotid Bruit  Musculoskeletal: no joint swelling/tenderness, no abnormal movements  Skin: No rashes    Neurological Exam:  HF: A x O x 3, appropriately interactive, normal affect, speech fluent, no aphasia or paraphasic errors. Naming /repetition intact   CN: PERRL, EOMI, VFF, facial sensation normal, no NLFD, tongue midline  Motor: No pronator drift, Strength 5/5 in all 4 ext, normal bulk and tone, no tremor, rigidity or bradykinesia  Sens: Intact to light touch  Reflexes: Symmetric and normal, downgoing toes b/l  Coord:  No FNFA, dysmetria, BANDAR intact   Gait/Balance: Not tested               
HPI:  61 y/o male w/ a PMHx of BPH presents to the ED from [facility] for abnormal MRI showing bilateral SDH.  Pt reports he was at  ED s/p syncopal episode, + fall, +head truama, +LOC on 2/26/22 and again in 4/12/22.  Pt seen by Dr. Morfin for worsening complaints of frontal HA, nausea, and lack of movement in b/l arms. Pt also c/o right hand/arm numbness. Pt was seen at a concussion center with Dr. Morfin and was referred to [facility]  for an MRI which he had today and was found to have b/l brain bleeds. Pt endorses taking Advil 4 x a day w/ moderate improvement. Denies vison or speech changes. Denies incontinence, dizziness, or SOB, or CP. Pt able to ambulate but is unsteady.     5/5- POD #1, s/p jose hole and evacuation of SDH. Pt doing well, no events overnight. Pt c/o headache. PAULINO drains in place and functional. Post-op ct showed small residual SDHs.     Vital Signs Last 24 Hrs  T(C): 36.5 (05 May 2022 08:00), Max: 37.1 (04 May 2022 17:01)  T(F): 97.7 (05 May 2022 08:00), Max: 98.7 (04 May 2022 17:01)  HR: 50 (05 May 2022 08:00) (50 - 80)  BP: 112/64 (05 May 2022 08:00) (103/59 - 139/75)  BP(mean): 75 (05 May 2022 08:00) (70 - 93)  RR: 9 (05 May 2022 08:00) (7 - 22)  SpO2: 98% (05 May 2022 08:00) (96% - 100%)    MEDICATIONS  (STANDING):  ceFAZolin  Injectable. 1000 milliGRAM(s) IV Push every 8 hours  dextrose 5%. 1000 milliLiter(s) (50 mL/Hr) IV Continuous <Continuous>  dextrose 50% Injectable 25 Gram(s) IV Push once  folic acid 1 milliGRAM(s) Oral daily  insulin lispro (ADMELOG) corrective regimen sliding scale   SubCutaneous four times a day before meals  levETIRAcetam  IVPB 750 milliGRAM(s) IV Intermittent every 12 hours  multivitamin 1 Tablet(s) Oral daily  pantoprazole  Injectable 40 milliGRAM(s) IV Push daily  polyethylene glycol 3350 17 Gram(s) Oral at bedtime  sodium chloride 0.9%. 1000 milliLiter(s) (75 mL/Hr) IV Continuous <Continuous>  tamsulosin 0.4 milliGRAM(s) Oral two times a day  thiamine 100 milliGRAM(s) Oral daily    MEDICATIONS  (PRN):  acetaminophen   IVPB .. 1000 milliGRAM(s) IV Intermittent once PRN Moderate Pain (4 - 6)  dextrose Oral Gel 15 Gram(s) Oral once PRN Blood Glucose LESS THAN 70 milliGRAM(s)/deciliter  HYDROmorphone  Injectable 1 milliGRAM(s) IV Push every 4 hours PRN Severe Pain (7 - 10)  labetalol Injectable 10 milliGRAM(s) IV Push every 1 hour PRN sbp > 150  labetalol Injectable 10 milliGRAM(s) IV Push every 1 hour PRN Systolic blood pressure >  160  ondansetron Injectable 4 milliGRAM(s) IV Push every 6 hours PRN Nausea and/or Vomiting  oxyCODONE    IR 10 milliGRAM(s) Oral every 4 hours PRN Severe Pain (7 - 10)  oxyCODONE    IR 5 milliGRAM(s) Oral every 4 hours PRN Moderate Pain (4 - 6)  senna 2 Tablet(s) Oral at bedtime PRN Constipation    ROS: pertinent positives in HPI, all other ROS were reviewed and are negative     PHYSICAL EXAM:  Constitutional: sleeping but arousble, c/o headache   HEENT: PERRLA, EOMI, hear wrap in place, b/l subdural PAULINO drain functional   Neck: Supple  Respiratory: Breath sounds are clear bilaterally  Cardiovascular: S1 and S2, regular rhythm  Gastrointestinal: Soft, NT/ND  Extremities:  no edema  Vascular: No carotid Bruit  Musculoskeletal: no joint swelling/tenderness, no abnormal movements  Skin: No rashes    Neurological Exam:  HF: A x O x 3, appropriately interactive, normal affect, speech fluent, no aphasia or paraphasic errors. Naming /repetition intact   CN: PERRL, EOMI, VFF, facial sensation normal, no NLFD, tongue midline  Motor: No pronator drift, Strength 5/5 in all 4 ext, normal bulk and tone, no tremor, rigidity or bradykinesia  Sens: Intact to light touch  Reflexes: Symmetric and normal, downgoing toes b/l  Coord:  No FNFA, dysmetria, BANDAR intact   Gait/Balance: Not tested     LABS:                         13.0   11.77 )-----------( 248      ( 05 May 2022 05:56 )             38.9     05-05    137  |  108  |  20  ----------------------------<  150<H>  4.5   |  26  |  0.83    Ca    8.9      05 May 2022 05:56  Phos  3.5     05-05  Mg     2.2     05-05    TPro  6.7  /  Alb  3.3  /  TBili  0.5  /  DBili  x   /  AST  9<L>  /  ALT  15  /  AlkPhos  100  05-05    LIVER FUNCTIONS - ( 05 May 2022 05:56 )  Alb: 3.3 g/dL / Pro: 6.7 gm/dL / ALK PHOS: 100 U/L / ALT: 15 U/L / AST: 9 U/L / GGT: x           RADIOLOGY:  < from: CT Head No Cont (05.04.22 @ 23:01) >  Brain: Bilateral subdural drains are present overlying the cerebral  convexities. Bilateral pneumocephalus. No recent prior CT brain is available  for direct comparison. There is a mixed isodense and hypodense right   subduralhematoma measuring approximately 11 mm in thickness on coronal image 41 of  series 602. There is a predominantly hypodense left subdural fluid collection  measuring up to 11mm in thickness on coronal image 23 of series 602. The  subdural fluid collections contribute to localized sulcal mass effect. No  frankly hyperdense intracranial hemorrhage seen. No evolving territorial  infarct. No significant midline shift.    Right cerebellar parenchymal calcifications are again demonstrated.  Cerebral ventricles: The ventricles are similar in size compared to the   prior study. Ventricular size asymmetry may be chronic. A tiny hyperdense   colloid cyst is again demonstrated in the anterior 3rd ventricle.  Paranasal sinuses: Mild ethmoid mucosal thickening.  Mastoid air cells: Visualized mastoid air cells are well aerated.    Bones/joints: No acute fracture.  Soft tissues: Scalp soft tissue swelling with air present. There are skin staples.    IMPRESSION:  Postoperative changes with residual bilateral cerebral convexity subdural fluid      
HPI:  61 y/o male w/ a PMHx of BPH presents to the ED from [facility] for abnormal MRI showing bilateral SDH.  Pt reports he was at  ED s/p syncopal episode, + fall, +head truama, +LOC on 2/26/22 and again in 4/12/22.  Pt seen by Dr. Morfin for worsening complaints of frontal HA, nausea, and lack of movement in b/l arms. Pt also c/o right hand/arm numbness. Pt was seen at a concussion center with Dr. Morfin and was referred [facility]  for an MRI which he had today and was found to have b/l brain bleeds. Pt endorses taking Advil 4 x a day w/ moderate improvement. Denies vison or speech changes. Denies incontinence, dizziness, or SOB, or CP. Pt able to ambulate but is unsteady.     5/5- POD #1, s/p jose hole and evacuation of SDH. Pt doing well, no events overnight. Pt c/o headache. PAULINO drains in place and functional. Post-op ct showed small residual SDHs.     5/6- POD#2 Patient seen and examined on AM rounds with Dr. Morfin. No acute events overnight. Right PAULINO drain with minimal output. Head CT this AM reviewed. Complains of headache this AM otherwise denies numb/tingling, weakness, n/v.    Vital Signs Last 24 Hrs  T(C): 36.2 (06 May 2022 10:00), Max: 36.8 (06 May 2022 01:00)  T(F): 97.2 (06 May 2022 10:00), Max: 98.2 (06 May 2022 01:00)  HR: 71 (06 May 2022 14:00) (60 - 84)  BP: 119/68 (06 May 2022 14:00) (99/70 - 133/62)  BP(mean): 81 (06 May 2022 14:00) (71 - 97)  RR: 12 (06 May 2022 14:00) (9 - 21)  SpO2: 97% (06 May 2022 14:00) (94% - 99%)    R PAULINO 8cc/24hrs  L PAULINO 195cc/24hrs    MEDICATIONS  (STANDING):  ceFAZolin   IVPB 2000 milliGRAM(s) IV Intermittent once  ceFAZolin  Injectable. 1000 milliGRAM(s) IV Push every 8 hours  folic acid 1 milliGRAM(s) Oral daily  levETIRAcetam 750 milliGRAM(s) Oral two times a day  multivitamin 1 Tablet(s) Oral daily  pantoprazole    Tablet 40 milliGRAM(s) Oral before breakfast  polyethylene glycol 3350 17 Gram(s) Oral at bedtime  tamsulosin 0.4 milliGRAM(s) Oral two times a day  thiamine 100 milliGRAM(s) Oral daily    MEDICATIONS  (PRN):  acetaminophen   IVPB .. 1000 milliGRAM(s) IV Intermittent once PRN Moderate Pain (4 - 6)  HYDROmorphone  Injectable 1 milliGRAM(s) IV Push every 4 hours PRN Severe Pain (7 - 10)  labetalol Injectable 10 milliGRAM(s) IV Push every 1 hour PRN sbp > 150  ondansetron Injectable 4 milliGRAM(s) IV Push every 6 hours PRN Nausea and/or Vomiting  oxyCODONE    IR 10 milliGRAM(s) Oral every 4 hours PRN Severe Pain (7 - 10)  oxyCODONE    IR 5 milliGRAM(s) Oral every 4 hours PRN Moderate Pain (4 - 6)  senna 2 Tablet(s) Oral at bedtime PRN Constipation      PHYSICAL EXAM:  Constitutional: awake/alert  HEENT: PERRLA, EOMI, head wrap removed staples c/d/i  R PAULINO drain removed closed with staples  L PAULINO drain draining clear serous fluid  Neck: Supple  Respiratory: Breath sounds are clear bilaterally  Cardiovascular: S1 and S2, regular rhythm  Gastrointestinal: Soft, NT/ND  Extremities:  no edema  Vascular: No carotid Bruit  Musculoskeletal: no joint swelling/tenderness, no abnormal movements  Skin: No rashes    Neurological Exam:  HF: A x O x 3, appropriately interactive, normal affect, speech fluent, no aphasia or paraphasic errors. Naming /repetition intact   CN: PERRL, EOMI, VFF, facial sensation normal, no NLFD, tongue midline  Motor: No pronator drift, Strength 5/5 in all 4 ext, normal bulk and tone, no tremor, rigidity or bradykinesia  Sens: Intact to light touch  Reflexes: Symmetric and normal, downgoing toes b/l  Coord:  No FNFA, dysmetria, BANDAR intact   Gait/Balance: Not tested           LABS:                         13.0   11.77 )-----------( 248      ( 05 May 2022 05:56 )             38.9     05-05    137  |  108  |  20  ----------------------------<  150<H>  4.5   |  26  |  0.83    Ca    8.9      05 May 2022 05:56  Phos  3.5     05-05  Mg     2.2     05-05    TPro  6.7  /  Alb  3.3  /  TBili  0.5  /  DBili  x   /  AST  9<L>  /  ALT  15  /  AlkPhos  100  05-05    LIVER FUNCTIONS - ( 05 May 2022 05:56 )  Alb: 3.3 g/dL / Pro: 6.7 gm/dL / ALK PHOS: 100 U/L / ALT: 15 U/L / AST: 9 U/L / GGT: x                 RADIOLOGY:   CT Head No Cont (05.06.22 @ 10:36)  IMPRESSION:  No significant interval change from 5/4/2022      
  Pt admitted under [facility] D # 1  ICU # 1    CC:  SDH    HPI:    61 y/o male with BPH presents to the ED from [facility] for abnormal MRI showing bilateral SDH.  Pt reports he was at  ED s/p syncopal episode, + fall, +head truama, +LOC on 22 and again in 22.  Pt seen by Dr. Morfin for worsening complaints of frontal HA, nausea, and lack of movement in b/l arms. Pt also c/o right hand/arm numbness. Pt was seen at a concussion center with Dr. Morfin and was referred to [facility] for an MRI which he had today and was found to have b/l brain bleeds. Pt endorses taking Advil 4 x a day w/ moderate improvement. Denies vison or speech changes. Denies incontinence, dizziness, or SOB, or CP. Pt able to ambulate but is unsteady.     :  Pt seen and examined in ICU during IDR.  POD # 1 S/P evacuation.  Awake and alert. No fevers.  No gtts    PMH:  As above.     PSH:  As above.     FH: Non Contributory other than those listed in HPI    Social History:  No smoking or ETOH    MEDICATIONS  (STANDING):  ceFAZolin   IVPB 2000 milliGRAM(s) IV Intermittent once  ceFAZolin  Injectable. 1000 milliGRAM(s) IV Push every 8 hours  dextrose 5%. 1000 milliLiter(s) (50 mL/Hr) IV Continuous <Continuous>  dextrose 50% Injectable 25 Gram(s) IV Push once  folic acid 1 milliGRAM(s) Oral daily  insulin lispro (ADMELOG) corrective regimen sliding scale   SubCutaneous four times a day before meals  levETIRAcetam 750 milliGRAM(s) Oral two times a day  multivitamin 1 Tablet(s) Oral daily  pantoprazole    Tablet 40 milliGRAM(s) Oral before breakfast  polyethylene glycol 3350 17 Gram(s) Oral at bedtime  sodium chloride 0.9%. 1000 milliLiter(s) (75 mL/Hr) IV Continuous <Continuous>  tamsulosin 0.4 milliGRAM(s) Oral two times a day  thiamine 100 milliGRAM(s) Oral daily    MEDICATIONS  (PRN):  acetaminophen   IVPB .. 1000 milliGRAM(s) IV Intermittent once PRN Moderate Pain (4 - 6)  dextrose Oral Gel 15 Gram(s) Oral once PRN Blood Glucose LESS THAN 70 milliGRAM(s)/deciliter  HYDROmorphone  Injectable 1 milliGRAM(s) IV Push every 4 hours PRN Severe Pain (7 - 10)  labetalol Injectable 10 milliGRAM(s) IV Push every 1 hour PRN sbp > 150  ondansetron Injectable 4 milliGRAM(s) IV Push every 6 hours PRN Nausea and/or Vomiting  oxyCODONE    IR 10 milliGRAM(s) Oral every 4 hours PRN Severe Pain (7 - 10)  oxyCODONE    IR 5 milliGRAM(s) Oral every 4 hours PRN Moderate Pain (4 - 6)  senna 2 Tablet(s) Oral at bedtime PRN Constipation      Allergies: NKDA    ROS:  SEE BELOW    Height (cm): 172.7 ( @ 16:51)  Weight (kg): 79.6 ( @ 23:03)  BMI (kg/m2): 26.7 ( @ 23:03)    ICU Vital Signs Last 24 Hrs  T(C): 36.5 (05 May 2022 08:00), Max: 37.1 (04 May 2022 17:01)  T(F): 97.7 (05 May 2022 08:00), Max: 98.7 (04 May 2022 17:01)  HR: 50 (05 May 2022 08:00) (50 - 80)  BP: 112/64 (05 May 2022 08:00) (103/59 - 139/75)  BP(mean): 75 (05 May 2022 08:00) (70 - 93)  ABP: --  ABP(mean): --  RR: 9 (05 May 2022 08:00) (7 - 22)  SpO2: 98% (05 May 2022 08:00) (96% - 100%)          I&O's Summary    04 May 2022 07:01  -  05 May 2022 07:00  --------------------------------------------------------  IN: 640 mL / OUT: 455 mL / NET: 185 mL    05 May 2022 07:01  -  05 May 2022 11:03  --------------------------------------------------------  IN: 0 mL / OUT: 375 mL / NET: -375 mL        Physical Exam:  SEE BELOW                          13.0   11.77 )-----------( 248      ( 05 May 2022 05:56 )             38.9           137  |  108  |  20  ----------------------------<  150<H>  4.5   |  26  |  0.83    Ca    8.9      05 May 2022 05:56  Phos  3.5     05-  Mg     2.2         TPro  6.7  /  Alb  3.3  /  TBili  0.5  /  DBili  x   /  AST  9<L>  /  ALT  15  /  AlkPhos  100  05-                Urinalysis Basic - ( 04 May 2022 19:37 )    Color: Yellow / Appearance: Clear / S.020 / pH: x  Gluc: x / Ketone: Negative  / Bili: Negative / Urobili: Negative   Blood: x / Protein: Negative / Nitrite: Negative   Leuk Esterase: Negative / RBC: x / WBC x   Sq Epi: x / Non Sq Epi: x / Bacteria: x        DVT Prophylaxis:                                                            Contraindication:     Advanced Directives:    Discussed with:    Visit Information:  Time spent excluding procedure:      ** Time is exclusive of billed procedures and/or teaching and/or routine family updates.        
  Pt admitted under [facility] D # 4  ICU # 4    CC:  SDH    HPI:    63 y/o male with BPH presents to the ED from [facility] for abnormal MRI showing bilateral SDH.  Pt reports he was at  ED s/p syncopal episode, + fall, +head truama, +LOC on 2/26/22 and again in 4/12/22.  Pt seen by Dr. Morfin for worsening complaints of frontal HA, nausea, and lack of movement in b/l arms. Pt also c/o right hand/arm numbness. Pt was seen at a concussion center with Dr. Morfin and was referred to [facility] for an MRI which he had today and was found to have b/l brain bleeds. Pt endorses taking Advil 4 x a day w/ moderate improvement. Denies vison or speech changes. Denies incontinence, dizziness, or SOB, or CP. Pt able to ambulate but is unsteady.     5/5:  Pt seen and examined in ICU during IDR.  POD # 1 S/P evacuation.  Awake and alert. No fevers.  No gtts    5/6:  Pt seen and examined in ICU during IDR.  No events overnight.  POD # 2 S/P evacuation. Eating.  FS good. Tm 98.2   Doing well post op    5/7:  Pt seen and examined in ICU during IDR.  No events overnight.  Still has 1 drain.  Tm 98.2       5/8:  Pt seen and examined in ICU during IDR.  Drain is out.  No events overnight.  No fevers.      PMH:  As above.     PSH:  As above.     FH: Non Contributory other than those listed in HPI    Social History:  NC    MEDICATIONS  (STANDING):  folic acid 1 milliGRAM(s) Oral daily  levETIRAcetam 750 milliGRAM(s) Oral two times a day  multivitamin 1 Tablet(s) Oral daily  pantoprazole    Tablet 40 milliGRAM(s) Oral before breakfast  polyethylene glycol 3350 17 Gram(s) Oral at bedtime  tamsulosin 0.4 milliGRAM(s) Oral two times a day  thiamine 100 milliGRAM(s) Oral daily    MEDICATIONS  (PRN):  acetaminophen   IVPB .. 1000 milliGRAM(s) IV Intermittent once PRN Moderate Pain (4 - 6)  HYDROmorphone  Injectable 1 milliGRAM(s) IV Push every 4 hours PRN Severe Pain (7 - 10)  labetalol Injectable 10 milliGRAM(s) IV Push every 1 hour PRN sbp > 150  ondansetron Injectable 4 milliGRAM(s) IV Push every 6 hours PRN Nausea and/or Vomiting  oxyCODONE    IR 10 milliGRAM(s) Oral every 4 hours PRN Severe Pain (7 - 10)  oxyCODONE    IR 5 milliGRAM(s) Oral every 4 hours PRN Moderate Pain (4 - 6)  senna 2 Tablet(s) Oral at bedtime PRN Constipation      Allergies: NKDA    ROS:  SEE BELOW        ICU Vital Signs Last 24 Hrs  T(C): 36.8 (08 May 2022 06:00), Max: 36.9 (07 May 2022 22:00)  T(F): 98.2 (08 May 2022 06:00), Max: 98.4 (07 May 2022 22:00)  HR: 85 (08 May 2022 06:00) (68 - 99)  BP: 109/68 (08 May 2022 06:00) (108/63 - 129/73)  BP(mean): 76 (08 May 2022 06:00) (71 - 89)  ABP: --  ABP(mean): --  RR: 11 (08 May 2022 06:00) (8 - 35)  SpO2: 96% (08 May 2022 04:00) (94% - 99%)          I&O's Summary    07 May 2022 07:01  -  08 May 2022 07:00  --------------------------------------------------------  IN: 850 mL / OUT: 665 mL / NET: 185 mL        Physical Exam:  SEE BELOW                              DVT Prophylaxis:                                                            Contraindication:     Advanced Directives:    Discussed with:    Visit Information:  Time spent excluding procedure:      ** Time is exclusive of billed procedures and/or teaching and/or routine family updates.

## 2022-05-08 NOTE — DISCHARGE NOTE PROVIDER - DETAILS OF MALNUTRITION DIAGNOSIS/DIAGNOSES
This patient has been assessed with a concern for Malnutrition and was treated during this hospitalization for the following Nutrition diagnosis/diagnoses:     -  05/06/2022: Moderate protein-calorie malnutrition

## 2022-05-08 NOTE — DISCHARGE NOTE PROVIDER - CARE PROVIDER_API CALL
Kory Morfin; PhD)  Neurosurgery  [address] [facility] [address] [facility] [address], [address]nd [place] [address]  Phone: [phone]  Fax: [phone]  Follow Up Time: 2 weeks

## 2022-05-09 ENCOUNTER — NON-APPOINTMENT (OUTPATIENT)
Age: 62
End: 2022-05-09

## 2022-05-18 ENCOUNTER — APPOINTMENT (OUTPATIENT)
Dept: CT IMAGING | Facility: CLINIC | Age: 62
End: 2022-05-18
Payer: COMMERCIAL

## 2022-05-18 ENCOUNTER — APPOINTMENT (OUTPATIENT)
Dept: NEUROSURGERY | Facility: CLINIC | Age: 62
End: 2022-05-18
Payer: COMMERCIAL

## 2022-05-18 ENCOUNTER — OUTPATIENT (OUTPATIENT)
Dept: OUTPATIENT SERVICES | Facility: HOSPITAL | Age: 62
LOS: 1 days | End: 2022-05-18
Payer: COMMERCIAL

## 2022-05-18 VITALS
OXYGEN SATURATION: 99 % | HEIGHT: 69 IN | BODY MASS INDEX: 25.18 KG/M2 | HEART RATE: 83 BPM | SYSTOLIC BLOOD PRESSURE: 111 MMHG | TEMPERATURE: 98.5 F | WEIGHT: 170 LBS | DIASTOLIC BLOOD PRESSURE: 76 MMHG

## 2022-05-18 DIAGNOSIS — S06.0X9A CONCUSSION WITH LOSS OF CONSCIOUSNESS OF UNSPECIFIED DURATION, INITIAL ENCOUNTER: ICD-10-CM

## 2022-05-18 DIAGNOSIS — Z00.8 ENCOUNTER FOR OTHER GENERAL EXAMINATION: ICD-10-CM

## 2022-05-18 PROCEDURE — 70450 CT HEAD/BRAIN W/O DYE: CPT

## 2022-05-18 PROCEDURE — 70450 CT HEAD/BRAIN W/O DYE: CPT | Mod: 26

## 2022-05-18 PROCEDURE — 99024 POSTOP FOLLOW-UP VISIT: CPT

## 2022-05-18 NOTE — CONSULT LETTER
[Dear  ___] : Dear  [unfilled], [Courtesy Letter:] : I had the pleasure of seeing your patient, [unfilled], in my office today. [Sincerely,] : Sincerely, [FreeTextEntry2] : [id number] [FreeTextEntry1] : Viet Calderón is a 62-year-old male who presents today for evaluation of concussion.  On 2/26/2022 patient had sustained a syncopal episode in the bathroom.  Positive loss of consciousness for approximately 30 seconds.  Does not remember events happening before the injury.  He is able to recall events happening after the injury.  As per ER notes no prodromal symptoms.  Patient patient struck his head on the ground.  Patient was not on blood thinners.  Patient had underwent CT of the head indicating "no evidence of infarct or hemorrhage."  He had also underwent CT of the neck indicating moderate spondylosis.  Lastly patient underwent CT of the abdomen and pelvis which indicated "mild degenerative changes in the lumbar spine as well as grade 1 anterolisthesis of L5 with respect to S1 with associated bilateral spondylolysis."  Patient at that time was requesting to be discharged home as per ER note.  Patient was instructed to follow-up with cardiology.  Patient then presented to [facility] on 4/18/2022 with headaches, nausea and feeling off balance.  Patient was recommended to follow-up with concussion program.  Patient was also noted to have right olecranon bursitis.  He was referred to Ortho. \par \par Patient reports overall improvement every day.  Patient reports light and noise sensitivity, nausea, dizziness and imbalance.  Patient reports difficulties with falling asleep, nervousness and anxiety.  Patient denies feeling like in a fog or confusion or difficulties with concentration or memory or neck pain. Patient uses Advil as needed with minor relief\par \par Patient denies any neck pain or arm weakness or shooting pains.  He reports numbness and tingling radiating from his elbow into the fourth and fifth digit of his hands bilaterally x2 days.  He reports difficulties with dexterity bilaterally since the fall.  He reports a sensation of off balance however not tripping over his feet.\par \par Patient denies any lower back pain.  He denies bowel or bladder dysfunction function.  He denies saddle anesthesia.  He denies any lower extremity numbness tingling weakness or pain.\par \par Patient appears in no acute distress. He is alert and oriented to time and date. When given a list of 5 words he was able to recite all  5 immediately after. After a brief pause, he was able to recite 3/5 words again. After another pause, he was able to recite 4 out of the 5 words. He was able to recite up to 4 numbers in reverse order without difficulty. He was also able to recite the months in reverse order without difficulty. Patient displays full cervical range of motion.  No cervical tenderness noted. Motor and sensory exam intact. Deep tendon reflexes intact. Motor coordination as evidenced by finger to nose testing intact. Cranial nerves intact. Pupils equal and reactive to light. Hearing intact.  No facial droop noted. Tongue protrudes in the midline. Facial sensation and movement symmetric and normal. Strength equal and normal to bilateral upper and lower extremities.  No saccades or nystagmus noted. Normal vestibular ocular reflex. No symptoms elicited with head turns on fixed point. Patient reports sensation of off balance with head turns with ambulation. Difficulties noted with tandem stance and tandem walk. He is able to perform double and single leg stance without difficulty. Negative pronator drift. Negative Romberg's. Patient was able to recall 0 out of the 5 words after a 10 minute pause. Orientation score 5/5, Immediate memory score 12/15, concentration score 3/5, delayed memory score 0/5. Total cognitive inefficiency score 20/30. Negative Froment's.  Negative Wartenberg's.  Negative Tinel's.  Negative Phalen's.  Negative Jules's.  Negative clonus.\par \par I provided patient with a prescription for an MRI of the cervical spine and head.  I have also provided the patient with a referral for neuro-ophthalmology and vestibular therapy.  I recommended following up in 3 to 4 weeks.  Patient has been advised to call with any further questions or concerns or with any new or worsening symptoms.\par \par \par  [FreeTextEntry3] : Dena Raymundo, MSN, FNP-BC\par Nurse Practitioner\par Neurosurgery\par [address] [place] [address], [address]nd floor \[place] [address] \par Office: [phone] \par Fax: [phone]\par \par

## 2022-05-23 ENCOUNTER — APPOINTMENT (OUTPATIENT)
Dept: NEUROSURGERY | Facility: CLINIC | Age: 62
End: 2022-05-23

## 2022-05-26 NOTE — REASON FOR VISIT
[de-identified] : [facility] frontal cranial jose holes for evacuation of subdural hematomas associated with brain compression [de-identified] : 5/4/2022 [de-identified] : 2 [Spouse] : spouse

## 2022-05-26 NOTE — CONSULT LETTER
[Dear  ___] : Dear  [unfilled], [Courtesy Letter:] : I had the pleasure of seeing your patient, [unfilled], in my office today. [Sincerely,] : Sincerely, [FreeTextEntry1] : Viet Calderón is a 62-year-old male who presents today for routine post surgical follow up.   The patient  suffered a closed head injury after a fall on  2/26/2022 he had a syncopal episode in the bathroom.  There was a positive loss of consciousness for approximately 30 seconds and he does not remember events happening before the injury.  He is able to recall events happening after the injury.   Patient was not on any anticoagulation therapy.  Patient had underwent CT of the head indicating "no evidence of infarct or hemorrhage."  He had also underwent CT of the neck indicating moderate spondylosis but no acute injury.  Lastly patient underwent CT of the abdomen and pelvis which indicated "mild degenerative changes in the lumbar spine as well as grade 1 anterolisthesis of L5 with respect to S1 with associated bilateral spondylolysis."  No acute treatment intervention was required and an appropriate conservative post concussion approach to care taken.\par \par On 4/18/2022 the patient presented again to  ED with headaches, nausea, episodes of emesis and feeling off balance.  No additional imaging studies were performed. Patient was recommended to follow-up with concussion program because of his protracted symptoms. Due to the intensity and time line of symptoms the patient was sent for a MRI scan of the brain. The MRI revealed significant expanded subdural collections bilaterally with brain compression. This despite a normal CT at the initial presentation 2/26/2022 . He was directed to go the [place] [facility] for admission from the radiology facility.  The patient underwent an urgent  jose hole procedure and  evacuation of the hematoma on 5/5/2022. There were no complications with his hospital course.  Today he is now 13 days post op from an evacuation and he is recovering well in his home environment.  \par \par Patient reports overall improvement every day.  Patient reports no light and noise sensitivity, nausea, dizziness or imbalance.  Patient reports difficulties with falling asleep with some nervousness and anxiety.  Patient denies feeling like in a fog or confusion or difficulties with concentration or memory.   Patient denies any neck pain or arm weakness or shooting pains.  He reports no paresthesia of his arms bilaterally (which he described at the time of the last ER admission).   He denies any lower extremity numbness tingling weakness or pain.\par \par We reviewed the pre-surgery images from [facility] performed 5/4/2022 showing bilateral SDH, worse on the left side.     The most recent scan from [facility] from today on May 18, 2022 showing a decrease in bilateral hematoma on the left side from 19 mm in size to 12 mm and appropriate cortical expansion at this early time point.    \par \par Patient appears in no acute distress. He is alert and oriented to time and date.   Speech clear and fluent.  No pronator drift is noted.  There is no coordination loss or dysmetria.  Negative Romberg and no imbalance when pushed.   Walking independently.   Staples were removed with ease.  The wound is clean and dry with no drainage and no swelling or erythema.  Surgical staples were removed without difficulty,\par  \par The patient has nicely recovered from his surgical intervention for bilateral SDH and mass effect.  The patient was recommended to use Melatonin for sleep disturbances.   He was given instructions for Melatonin and use 5 mg a tablet at night.  He may begin golf putting in two weeks.  I provided patient with a prescription for an follow up CT scan of his head in 3-4 weeks.  The patient will decrease the Keppra to once a day and then taper down over the next two weeks. The patient was given instructions for taper.  I have recommended following up in 3 to 4 weeks.  Patient has been advised to undergo a Jones exercise stress test before he begins any physical exercise.  The patient was advised to return to work on July 1, 2022.  No lifting over ten pounds for now.  Low resistance exercises may be performed.  Wound care was provided for the incision site.  The wound should be kept clean and dry.  The patient will wash the site with soap and water gently and pat dry.  No abrasive motion when washing.  If any signs of infection including redness, swelling, pain, fever, or drainage they will contact the office.  The patient will call with any further questions or concerns or with any new or worsening symptoms. \par \par Thank you for very kindly including me in the evaluation and support of your patient.  Please do not hesitate to contact me should you have any questions or concerns regarding this evaluation or the patient's ongoing follow-up care. [FreeTextEntry3] : Kory Morfin MD, PhD, FRCPSC                           \par Attending Neurosurgeon \par  of Neurosurgery \par [facility] \par [address] [facility], [address]nd floor \[place] [address] \par Office: [phone] \par Fax: [phone]\par

## 2022-06-13 ENCOUNTER — APPOINTMENT (OUTPATIENT)
Dept: NEUROSURGERY | Facility: CLINIC | Age: 62
End: 2022-06-13
Payer: COMMERCIAL

## 2022-06-13 ENCOUNTER — OUTPATIENT (OUTPATIENT)
Dept: OUTPATIENT SERVICES | Facility: HOSPITAL | Age: 62
LOS: 1 days | End: 2022-06-13
Payer: COMMERCIAL

## 2022-06-13 ENCOUNTER — APPOINTMENT (OUTPATIENT)
Dept: CT IMAGING | Facility: CLINIC | Age: 62
End: 2022-06-13
Payer: COMMERCIAL

## 2022-06-13 VITALS
TEMPERATURE: 96.4 F | OXYGEN SATURATION: 99 % | BODY MASS INDEX: 25.92 KG/M2 | HEART RATE: 79 BPM | DIASTOLIC BLOOD PRESSURE: 71 MMHG | HEIGHT: 69 IN | WEIGHT: 175 LBS | SYSTOLIC BLOOD PRESSURE: 116 MMHG

## 2022-06-13 DIAGNOSIS — S06.0X9A CONCUSSION WITH LOSS OF CONSCIOUSNESS OF UNSPECIFIED DURATION, INITIAL ENCOUNTER: ICD-10-CM

## 2022-06-13 PROCEDURE — 70450 CT HEAD/BRAIN W/O DYE: CPT | Mod: 26

## 2022-06-13 PROCEDURE — 99024 POSTOP FOLLOW-UP VISIT: CPT

## 2022-06-13 PROCEDURE — 70450 CT HEAD/BRAIN W/O DYE: CPT

## 2022-06-13 NOTE — CONSULT LETTER
[Dear  ___] : Dear  [unfilled], [Courtesy Letter:] : I had the pleasure of seeing your patient, [unfilled], in my office today. [Sincerely,] : Sincerely, [FreeTextEntry1] : This very pleasant 62-year-old gentleman returns for routine postsurgical follow-up.  You may recall that the patient suffered a closed head injury when he fell in the shower on 26 April 2022.  The patient was seen at [facility] emergency room because of brief loss of consciousness.  The initial CT scan did not reveal any evidence of acute or concerning intracerebral hemorrhage.  The patient however did have some ongoing problems with nausea and vomiting as well as balance issues.  He was seen in [facility] emergency room on 18 April 2022.  No CT scan was performed at that time but the patient was referred on to the concussion program.  The patient also indicated he had developed in particular right arm paresthesias.  Ultimately through the concussion program the patient underwent an urgent MRI scan of the brain which revealed expanded significant bilateral subdural hematomas with compression of the brain.  The patient was admitted to [facility] on 4 May 2022 and underwent bilateral frontal bur hole evacuation of the subdurals on the evening of admission.  There were no complications.  The patient has now been recovering well in his home environment.  The patient returns today after undergoing further follow-up CT scan to evaluate resolution of the subdural collections over both hemispheres.  The patient indicates his symptoms have resolved.  He has occasional very mild frontal head discomfort without it being a full headache.  The patient in fact was able to play a full round of golf yesterday without difficulty.\par \par I have reviewed directly with the patient his updated CT scan.  There has been complete resolution of the subdural collections which looked like a hygroma on 18 May 2022.  The sulci and gyri of both hemispheres have expanded nicely to the calvarial surface.  The ventricles are of normal size.  No other lesions of concern are identified.\par \par On examination the patient is in no acute distress.  The patient is ambulating well without any balance issues.  Patient indicates that he has intact strength and sensation in all 4 limbs.  He also does not have any difficulties with coordination as evidenced by his ability to play golf yesterday at his normal level.\par \par I am very pleased with the patient's recovery at this point in time.  I have reassured the patient that there should be no long-term sequela to this injury based on his current recovery.  He may slowly increase his overall activities including regular exercise without restriction.  I have recommended that he start with gentle aerobic exercise and then escalate as tolerated.  There is no indication currently of a need for physical or rehabilitation follow-up.  At this time I have not arranged for any further follow-up but I would be more than pleased to see the patient again at any time should the need arise.\par \par Thank you for very kindly including me in the evaluation and treatment of your patient.  Please do not hesitate to contact me should you have any questions or concerns regarding this evaluation, the patient's recent diagnosis of a post head injury bilateral subdural hematoma collection, his subsequent surgery, or his ongoing follow-up care plan. [FreeTextEntry3] : Kory Morfin MD, PhD, FRCPSC \par Attending Neurosurgeon \par  of Neurosurgery \par [facility] \par [address], [address]nd floor \[place] [address] \par Office: [phone] \par Fax: [phone]\par \par

## 2022-06-13 NOTE — REASON FOR VISIT
[de-identified] : bilateral frontal jose holes for evacuation of subdural hematomas [de-identified] : 05/04/2022 [de-identified] : 5

## 2022-08-23 ENCOUNTER — NON-APPOINTMENT (OUTPATIENT)
Age: 62
End: 2022-08-23

## 2022-08-25 ENCOUNTER — NON-APPOINTMENT (OUTPATIENT)
Age: 62
End: 2022-08-25

## 2022-08-28 NOTE — DISCHARGE NOTE NURSING/CASE MANAGEMENT/SOCIAL WORK - NSPROEXTENSIONSOFSELF_GEN_A_NUR
none
ATTENDING PHYSICAL EXAM  GEN - NAD; well appearing; A+O x3  HEAD - NC/AT; EYES/NOSE - PERRL, EOMI, mucous membranes moist, no discharge; THROAT: Oral cavity and pharynx normal. No inflammation, swelling, exudate, or lesions  NECK: Neck supple, non-tender without lymphadenopathy, no masses, no JVD  PULMONARY - CTA b/l, symmetric breath sounds, no w/r/r  CARDIAC -s1s2, RRR, no M,R,G  ABDOMEN - +NABS, ND, mild TTP LUQ, soft, no guarding, no rebound, no masses   BACK - no CVA tenderness, No vertebral or paravertebral tenderness  EXTREMITIES - symmetric pulses, 2+ dp, capillary refill < 2 seconds, no clubbing, no cyanosis, no edema

## 2022-09-07 ENCOUNTER — APPOINTMENT (OUTPATIENT)
Dept: CT IMAGING | Facility: CLINIC | Age: 62
End: 2022-09-07

## 2022-09-07 ENCOUNTER — OUTPATIENT (OUTPATIENT)
Dept: OUTPATIENT SERVICES | Facility: HOSPITAL | Age: 62
LOS: 1 days | End: 2022-09-07
Payer: COMMERCIAL

## 2022-09-07 ENCOUNTER — APPOINTMENT (OUTPATIENT)
Dept: NEUROSURGERY | Facility: CLINIC | Age: 62
End: 2022-09-07

## 2022-09-07 VITALS
HEART RATE: 66 BPM | OXYGEN SATURATION: 99 % | TEMPERATURE: 97.7 F | SYSTOLIC BLOOD PRESSURE: 130 MMHG | WEIGHT: 175 LBS | DIASTOLIC BLOOD PRESSURE: 79 MMHG | BODY MASS INDEX: 25.92 KG/M2 | HEIGHT: 69 IN

## 2022-09-07 DIAGNOSIS — S06.0X9A CONCUSSION WITH LOSS OF CONSCIOUSNESS OF UNSPECIFIED DURATION, INITIAL ENCOUNTER: ICD-10-CM

## 2022-09-07 DIAGNOSIS — Z98.890 OTHER SPECIFIED POSTPROCEDURAL STATES: ICD-10-CM

## 2022-09-07 PROCEDURE — 70450 CT HEAD/BRAIN W/O DYE: CPT | Mod: 26

## 2022-09-07 PROCEDURE — 70450 CT HEAD/BRAIN W/O DYE: CPT

## 2022-09-07 PROCEDURE — 99214 OFFICE O/P EST MOD 30 MIN: CPT

## 2022-09-18 PROBLEM — S06.0X9A CONCUSSION: Status: ACTIVE | Noted: 2022-04-21

## 2022-09-18 NOTE — CONSULT LETTER
[Dear  ___] : Dear  [unfilled], [Courtesy Letter:] : I had the pleasure of seeing your patient, [unfilled], in my office today. 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  You may recall that the patient suffered a fall while showering at his home on 26 April 2022.  As you may recall the patient had progressive symptoms thereafter which resulted in additional diagnostic imaging which revealed bilateral subacute subdural hematomas with mass-effect.  The patient was admitted to [facility] through the emergency room on 4 May 2022 and underwent bilateral bur hole evacuation surgery without complication.  The patient was last seen in follow-up on June 13, 2022.  At that time CT scan imaging indicated good resolution of the subdural hematomas and expansion of the cerebral hemispheres to normal position.  The patient indicates that his headaches have resolved but that he does not yet feel quite normal.  That said, he is now golfing on an almost daily basis.  He does not indicate any specific problems with his game.  He denies headaches with bending or twisting.  He denies any problems with memory, concentration, or mood instability.  The patient has undergone updated CT scan.\par \par I have reviewed with the patient his CT scan performed this morning just prior to office hours.  The final report is not yet available.  The subdural collections over both hemispheres have resolved completely.  There is normal confirmation of the sulci and gyri over both hemispheres.  Incidental notation of calcification within the right cerebellar hemisphere is again indicated without change.\par \par On examination the patient is in no acute distress.  The patient appears healthy and tanned.  The patient's neck is supple.  Pupils are equal and reactive to light.  There is no nystagmus on lateral gaze.  There is no upward gaze palsy.  There is no diplopia.  The patient does not have difficulty with accommodation.  Face sensation and movement are symmetric and normal.  The patient's sense of smell is intact.  Hearing is intact.  Voice quality and swallow mechanism are normal.  There is no pronator drift.  Finger-nose pointing is normal.  There is no dysmetria.  Romberg test is negative.  The patient has no difficulty with heel-to-toe walking.  The patient has appropriate focus and concentration.  There is no word finding difficulty.  Short-term memory recall appears to be normal.\par \par I have reassured the patient that after significant head injury such as this one it is within the range of normal to experience some delay in recovery to complete normal feeling.  After performing a concussion protocol evaluation I have no immediate concerns regarding the patient's current symptom profile.  In fact it is quite reassuring that the patient is able to play high-level golf on a daily basis without significant impairment.  I have encouraged the patient to continue to be involved in exercise and recommended low intensity aerobic exercise.  I have provided a prescription for physical therapy to assist with ongoing recovery.\par \par Thank you for kindly including me in the ongoing evaluation and treatment of your patient.  Please do not hesitate to contact me should you have any concerns or questions regarding the patient's injury resulting in bilateral subdural hematomas, his surgery, or his ongoing follow-up care plan.\par \par Addendum: The CT scan was officially reported more than 24 hours after the examination.  There is an incidental finding of material within the superior aspect of the third ventricle more prominent toward the right-hand side which may represent either blood products or possibly a benign colloid.  It is appropriate for this to be followed conservatively as the patient currently does not have any concerns for exertional-based headaches or changes in vision.  It has been recommended that the patient would undergo an interval MRI scan with and without contrast.  I recommend this imaging be performed in a 1 year interval. [FreeTextEntry3] : Kory Morfin MD, PhD, FRCPSC \par Attending Neurosurgeon \par  of Neurosurgery \par [facility] \par [address]nd floor \[place] [address] \par Office: [phone] \par Fax: [phone]\par \par

## 2022-09-23 ENCOUNTER — NON-APPOINTMENT (OUTPATIENT)
Age: 62
End: 2022-09-23

## 2022-10-06 ENCOUNTER — APPOINTMENT (OUTPATIENT)
Dept: NEUROSURGERY | Facility: CLINIC | Age: 62
End: 2022-10-06
Payer: COMMERCIAL

## 2022-10-06 VITALS
WEIGHT: 175 LBS | DIASTOLIC BLOOD PRESSURE: 77 MMHG | TEMPERATURE: 98.1 F | OXYGEN SATURATION: 99 % | BODY MASS INDEX: 25.92 KG/M2 | HEIGHT: 69 IN | HEART RATE: 80 BPM | SYSTOLIC BLOOD PRESSURE: 121 MMHG

## 2022-10-06 PROCEDURE — 99214 OFFICE O/P EST MOD 30 MIN: CPT

## 2022-11-07 ENCOUNTER — APPOINTMENT (OUTPATIENT)
Dept: UROLOGY | Facility: CLINIC | Age: 62
End: 2022-11-07

## 2022-11-07 VITALS
HEART RATE: 66 BPM | HEIGHT: 69 IN | DIASTOLIC BLOOD PRESSURE: 75 MMHG | RESPIRATION RATE: 16 BRPM | WEIGHT: 175 LBS | SYSTOLIC BLOOD PRESSURE: 118 MMHG | BODY MASS INDEX: 25.92 KG/M2

## 2022-11-07 DIAGNOSIS — R97.20 ELEVATED PROSTATE, SPECIFIC ANTIGEN [PSA]: ICD-10-CM

## 2022-11-07 PROCEDURE — 99214 OFFICE O/P EST MOD 30 MIN: CPT

## 2022-11-07 NOTE — ASSESSMENT
[FreeTextEntry1] : Residual bladder scan today showed 199 mL.\par \par 63 yo male with hx of BPH on tamsulosin x2, elevated PSA, negative MRI 2022, negative bx in 2015 who presents with stable LUTS consistent with BPH. \par \par Plan\par BPH \par - Prostate 78 gram on MRI 3/10/22\par -  cc \par - continue flomax x2 \par - discussed pro vs con of TURP \par - Pt will continue conservative management for now\par - Signs of urinary retention discussed. Pt to come if signs of retention \par \par Elevated PSA \par - PSA 5.27 2/7/22, s/p neg MRI 3/10/22 PIRADS 1, s/p Negative Bx 2015 \par - Repeat PSA today \par

## 2022-11-07 NOTE — HISTORY OF PRESENT ILLNESS
[FreeTextEntry1] : Viet Calderón returns to the office today.  He is 62 years old with history of BPH and associated lower urinary tract symptoms.  When I saw him last in February he had been using tamsulosin 0.4 mg twice daily.  He clearly notes dependency on this medication in order to maintain the stream and without it he has difficulty passing his urine and has been in near urinary retention several times. I had provided him with a prescription for finasteride which he used for 1 mos. He subsequently developed "dizziness" and fell and sustained a concussion and developed a subdural hematoma which necessitating surgical evacuation with neurosurgery. He attributes his fall to "dehydration" from taking finasteride so he stopped. He has made a full recovery and has not have any sequela from his subdural hematoma. There has not been significant changes in voiding sxs since stopping finasteride. His most bothersome sxs are nocturia x2, hesitancy at night, weak stream, and incomplete bladder emptying. He has not had recurrence of urinary retention since last visit. He denies dysuria, pain, hematuria, discharge, and incontinence. Denies sexual dysfunction. \par \par PSA in February of this year was 5.27 ng/mL with 24% free fraction.  MRI of the prostate was also performed earlier this year.  The prostate size was 78 cm³.  There were no suspicious lesions detected.  PSA density less than 0.1.

## 2022-11-11 ENCOUNTER — NON-APPOINTMENT (OUTPATIENT)
Age: 62
End: 2022-11-11

## 2022-11-11 LAB
PSA FREE FLD-MCNC: 23 %
PSA FREE SERPL-MCNC: 1.43 NG/ML
PSA SERPL-MCNC: 6.28 NG/ML

## 2023-01-07 ENCOUNTER — EMERGENCY (EMERGENCY)
Facility: HOSPITAL | Age: 63
LOS: 0 days | Discharge: HOME | End: 2023-01-07
Attending: STUDENT IN AN ORGANIZED HEALTH CARE EDUCATION/TRAINING PROGRAM
Payer: COMMERCIAL

## 2023-01-07 VITALS — WEIGHT: 160.06 LBS | HEIGHT: 64 IN

## 2023-01-07 VITALS
SYSTOLIC BLOOD PRESSURE: 143 MMHG | OXYGEN SATURATION: 99 % | TEMPERATURE: 99 F | DIASTOLIC BLOOD PRESSURE: 80 MMHG | HEART RATE: 88 BPM | RESPIRATION RATE: 17 BRPM

## 2023-01-07 DIAGNOSIS — N40.1 BENIGN PROSTATIC HYPERPLASIA WITH LOWER URINARY TRACT SYMPTOMS: ICD-10-CM

## 2023-01-07 DIAGNOSIS — R33.9 RETENTION OF URINE, UNSPECIFIED: ICD-10-CM

## 2023-01-07 DIAGNOSIS — R33.8 OTHER RETENTION OF URINE: ICD-10-CM

## 2023-01-07 LAB
ANION GAP SERPL CALC-SCNC: 8 MMOL/L — SIGNIFICANT CHANGE UP (ref 5–17)
APPEARANCE UR: CLEAR — SIGNIFICANT CHANGE UP
BASOPHILS # BLD AUTO: 0.04 K/UL — SIGNIFICANT CHANGE UP (ref 0–0.2)
BASOPHILS NFR BLD AUTO: 0.3 % — SIGNIFICANT CHANGE UP (ref 0–2)
BILIRUB UR-MCNC: NEGATIVE — SIGNIFICANT CHANGE UP
BUN SERPL-MCNC: 23 MG/DL — SIGNIFICANT CHANGE UP (ref 7–23)
CALCIUM SERPL-MCNC: 8.9 MG/DL — SIGNIFICANT CHANGE UP (ref 8.5–10.1)
CHLORIDE SERPL-SCNC: 107 MMOL/L — SIGNIFICANT CHANGE UP (ref 96–108)
CO2 SERPL-SCNC: 22 MMOL/L — SIGNIFICANT CHANGE UP (ref 22–31)
COLOR SPEC: YELLOW — SIGNIFICANT CHANGE UP
CREAT SERPL-MCNC: 0.81 MG/DL — SIGNIFICANT CHANGE UP (ref 0.5–1.3)
DIFF PNL FLD: ABNORMAL
EGFR: 101 ML/MIN/1.73M2 — SIGNIFICANT CHANGE UP
EOSINOPHIL # BLD AUTO: 0.05 K/UL — SIGNIFICANT CHANGE UP (ref 0–0.5)
EOSINOPHIL NFR BLD AUTO: 0.4 % — SIGNIFICANT CHANGE UP (ref 0–6)
GLUCOSE SERPL-MCNC: 123 MG/DL — HIGH (ref 70–99)
GLUCOSE UR QL: NEGATIVE — SIGNIFICANT CHANGE UP
HCT VFR BLD CALC: 38.4 % — LOW (ref 39–50)
HGB BLD-MCNC: 13.2 G/DL — SIGNIFICANT CHANGE UP (ref 13–17)
IMM GRANULOCYTES NFR BLD AUTO: 0.5 % — SIGNIFICANT CHANGE UP (ref 0–0.9)
KETONES UR-MCNC: ABNORMAL
LEUKOCYTE ESTERASE UR-ACNC: NEGATIVE — SIGNIFICANT CHANGE UP
LYMPHOCYTES # BLD AUTO: 1.28 K/UL — SIGNIFICANT CHANGE UP (ref 1–3.3)
LYMPHOCYTES # BLD AUTO: 9.9 % — LOW (ref 13–44)
MCHC RBC-ENTMCNC: 32.4 PG — SIGNIFICANT CHANGE UP (ref 27–34)
MCHC RBC-ENTMCNC: 34.4 GM/DL — SIGNIFICANT CHANGE UP (ref 32–36)
MCV RBC AUTO: 94.3 FL — SIGNIFICANT CHANGE UP (ref 80–100)
MONOCYTES # BLD AUTO: 0.64 K/UL — SIGNIFICANT CHANGE UP (ref 0–0.9)
MONOCYTES NFR BLD AUTO: 5 % — SIGNIFICANT CHANGE UP (ref 2–14)
NEUTROPHILS # BLD AUTO: 10.85 K/UL — HIGH (ref 1.8–7.4)
NEUTROPHILS NFR BLD AUTO: 83.9 % — HIGH (ref 43–77)
NITRITE UR-MCNC: NEGATIVE — SIGNIFICANT CHANGE UP
PH UR: 5 — SIGNIFICANT CHANGE UP (ref 5–8)
PLATELET # BLD AUTO: 242 K/UL — SIGNIFICANT CHANGE UP (ref 150–400)
POTASSIUM SERPL-MCNC: 3.9 MMOL/L — SIGNIFICANT CHANGE UP (ref 3.5–5.3)
POTASSIUM SERPL-SCNC: 3.9 MMOL/L — SIGNIFICANT CHANGE UP (ref 3.5–5.3)
PROT UR-MCNC: NEGATIVE — SIGNIFICANT CHANGE UP
RBC # BLD: 4.07 M/UL — LOW (ref 4.2–5.8)
RBC # FLD: 12.2 % — SIGNIFICANT CHANGE UP (ref 10.3–14.5)
SODIUM SERPL-SCNC: 137 MMOL/L — SIGNIFICANT CHANGE UP (ref 135–145)
SP GR SPEC: 1.01 — SIGNIFICANT CHANGE UP (ref 1.01–1.02)
UROBILINOGEN FLD QL: NEGATIVE — SIGNIFICANT CHANGE UP
WBC # BLD: 12.92 K/UL — HIGH (ref 3.8–10.5)
WBC # FLD AUTO: 12.92 K/UL — HIGH (ref 3.8–10.5)

## 2023-01-07 PROCEDURE — 80048 BASIC METABOLIC PNL TOTAL CA: CPT

## 2023-01-07 PROCEDURE — 85025 COMPLETE CBC W/AUTO DIFF WBC: CPT

## 2023-01-07 PROCEDURE — 87086 URINE CULTURE/COLONY COUNT: CPT

## 2023-01-07 PROCEDURE — 36415 COLL VENOUS BLD VENIPUNCTURE: CPT

## 2023-01-07 PROCEDURE — 81001 URINALYSIS AUTO W/SCOPE: CPT

## 2023-01-07 PROCEDURE — 99283 EMERGENCY DEPT VISIT LOW MDM: CPT

## 2023-01-07 PROCEDURE — 99284 EMERGENCY DEPT VISIT MOD MDM: CPT

## 2023-01-07 PROCEDURE — 51702 INSERT TEMP BLADDER CATH: CPT

## 2023-01-07 NOTE — ED ADULT NURSE NOTE - OBJECTIVE STATEMENT
Patient is a 62 year old male with history of BPH,  ambulated to the ED complaining of urinary retention X12 hours. Patient appears to be in  severe pain. 16 F Urrutia placed  and attached to bedisde drainage bag. 1100cc urine drained.

## 2023-01-07 NOTE — ED PROVIDER NOTE - OBJECTIVE STATEMENT
61 y/o M w/ PMH of BPH, prior ICH presenting w/ urinary retention. Reports was out drinking last night and when he returned home was unable to urinate. Last time urinated was around 10pm. Throughout the night he was having increasing pain. Kept trying to urinate but was unable to. Had urinated without issues earlier in the day. No pain meds taken prior to arrival. Denies fevers, chills, headache, dizziness, blurred vision, chest pain, cough, shortness of breath, n/v/d/c, MSK pain, rash.

## 2023-01-07 NOTE — ED PROVIDER NOTE - NSFOLLOWUPINSTRUCTIONS_ED_ALL_ED_FT
1) Follow up with your doctor Monday or Tuesday morning. Please call first thing Monday morning to be seen by the urologist  2) Return to the ED immediately for new or worsening symptoms   3) Please continue to take any home medications as prescribed  4) Your test results from your ED visit were discussed with you prior to discharge  5) You were provided with a copy of your test results    Urinary Retention    Urinary retention is the inability to completely empty your bladder. This is a common problem in older men, especially with enlarged prostates. If you are sent home with a naik catheter and a drainage system make sure to keep the drainage bag emptied and lower than your catheter. Keep the naik catheter in until you follow up with a urologist.    SEEK IMMEDIATE MEDICAL CARE IF YOU DEVELOP THE FOLLOWING SYMPTOMS: the catheter stops draining urine, the catheter falls out, abdominal pain, nausea/vomiting, or chills/fever.

## 2023-01-07 NOTE — ED ADULT TRIAGE NOTE - PRO INTERPRETER NEED 2
Pt c/o HI with a plan towards a specific person and SI X2days. States he has not been compliant with his meds X3days because he hasn't had access to them English

## 2023-01-07 NOTE — ED PROVIDER NOTE - CLINICAL SUMMARY MEDICAL DECISION MAKING FREE TEXT BOX
61 y/o M w/ PMH as above presenting w/ urinary retention. Pt uncomfortable appearing on initial exam. Hx and symptoms consistent w/ urinary retention. Urrutia placed w/ immediately relief of symptoms for pt. Draining approx 1100 cc on initial insertion. Will obtain UA to eval for UTI. Suspect likely overdistention of bladder from alcohol consumption causing the retention. Will check labs to eval for kidney function. Will reassess the need for additional interventions as clinically warranted.

## 2023-01-07 NOTE — ED PROVIDER NOTE - PHYSICAL EXAMINATION
Gen: Uncomfortable appearing, AOx3, able to make needs known, non-toxic  Head: NCAT  HEENT: EOMI, oral mucosa moist, normal conjunctiva  Lung: CTAB, no respiratory distress, no wheezes/rhonchi/rales B/L, speaking in full sentences  CV: RRR, no murmurs  Abd: non distended, tender and firm suprapubic area, no guarding, no CVA tenderness  MSK: no visible deformities  Neuro: Appears non focal  Skin: Warm, well perfused  Psych: normal affect

## 2023-01-07 NOTE — ED PROVIDER NOTE - PROGRESS NOTE DETAILS
Attending Sedrick: UA w/o signs of infection. Cr WNL. informed pt. recommended calling his urologist first thing Monday morning as naik will need to be removed in 48-72 hours. Return precautions provided, aneudy SOTELO.

## 2023-01-07 NOTE — ED ADULT TRIAGE NOTE - CHIEF COMPLAINT QUOTE
pt ambulatory to ED c/o inability to urinate. pt last urinated at 2200 last night. pt in severe pain in lobby. pt with pmhx enlarged prostate, on flomax. denies similar symptoms in the past. urologist Dr. Bower

## 2023-01-08 LAB
CULTURE RESULTS: NO GROWTH — SIGNIFICANT CHANGE UP
SPECIMEN SOURCE: SIGNIFICANT CHANGE UP

## 2023-01-10 ENCOUNTER — APPOINTMENT (OUTPATIENT)
Dept: UROLOGY | Facility: CLINIC | Age: 63
End: 2023-01-10
Payer: COMMERCIAL

## 2023-01-10 ENCOUNTER — OUTPATIENT (OUTPATIENT)
Dept: OUTPATIENT SERVICES | Facility: HOSPITAL | Age: 63
LOS: 1 days | End: 2023-01-10
Payer: COMMERCIAL

## 2023-01-10 VITALS — DIASTOLIC BLOOD PRESSURE: 80 MMHG | HEART RATE: 92 BPM | SYSTOLIC BLOOD PRESSURE: 127 MMHG | RESPIRATION RATE: 16 BRPM

## 2023-01-10 DIAGNOSIS — R33.8 OTHER RETENTION OF URINE: ICD-10-CM

## 2023-01-10 DIAGNOSIS — R35.0 FREQUENCY OF MICTURITION: ICD-10-CM

## 2023-01-10 PROCEDURE — 99214 OFFICE O/P EST MOD 30 MIN: CPT | Mod: 25

## 2023-01-10 PROCEDURE — 51700 IRRIGATION OF BLADDER: CPT

## 2023-01-10 PROCEDURE — 51798 US URINE CAPACITY MEASURE: CPT

## 2023-01-10 NOTE — HISTORY OF PRESENT ILLNESS
[FreeTextEntry1] : Viet Calderón returns to the office today.  He is a 62-year-old man with longstanding history of BPH and associated lower urinary tract symptoms.  He has been previously treated with tamsulosin which he continues.  I had advised him also to use finasteride which he did for a brief period of time but also felt as though it was causing him some lightheadedness and he decided to discontinue it.  He is currently on tamsulosin as monotherapy.  Few days ago he developed acute urinary retention.  He had been out socializing and had alcohol to drink and he thinks this may have prompted the retention.  Ultimately he sought help at the emergency room where a catheter was placed, draining approximately 1100 cc of urine.  He is here now for follow-up.\par \par The patient reports no symptoms of dysuria or fevers or chills leading up to the event of the retention.  He denies constipation.  No nausea or vomiting.  No flank pain or abdominal pain.

## 2023-01-10 NOTE — LETTER BODY
[Dear  ___] : Dear  [unfilled], [Courtesy Letter:] : I had the pleasure of seeing your patient, [unfilled], in my office today. [FreeTextEntry2] : Neil Grubbs DO\par [address]\[place] [address] [FreeTextEntry1] : This patient is going to be scheduled for transurethral resection of the prostate.  Please see my note below.  I have asked him to see you for preoperative risk assessment. [FreeTextEntry3] : Sincerely,\par \par \par \par \par Sidney Bower MD, FACS\par Chief of Urology, [facility]\par  of Urology\par Director of Robotic and Laparoscopic Surgery\par [facility]\par \par [facility]\par [address] [place] [address]\[place] [address]\par P: [phone]\par F: [phone]\par [email or web address]

## 2023-01-10 NOTE — ASSESSMENT
[FreeTextEntry1] : Voiding trial was performed today and the patient was successful in emptying his bladder to completion based on bladder scan.  I advised him to continue on with the tamsulosin.  We also reviewed additional management options such as bladder outlet surgery which I would suggest for him at this time based on steadily progressive urinary symptoms and most significantly this recent episode of urinary retention.  The patient has also had several near misses with urinary retention in the past.  Options were reviewed.  His MRI of the prostate done earlier this year shows a prostate size of 78 cm³.  This would make several options appropriate for him including transurethral resection of the prostate, laser enucleation, steam based ablation techniques, and simple prostatectomy.  In his case he would prefer to avoid any abdominal operation and I think in that setting, a transurethral resection of the prostate here would be quite appropriate.  We reviewed the surgery itself, expected hospital course and postoperative recovery.  We also reviewed that this is typically done on an outpatient basis and there would be a catheter in place for several days afterward and then removed in the office once adequate time had passed.  I have also explained to him that he would most likely be able to discontinue his current use of tamsulosin following the procedure.  Expectations postoperatively were reviewed including temporary and intermittent gross hematuria and irritative symptoms may be present.  He communicates his understanding and is in agreement with moving forward with a transurethral resection of the prostate.  I will schedule this at the next available time.

## 2023-01-13 DIAGNOSIS — R33.8 OTHER RETENTION OF URINE: ICD-10-CM

## 2023-01-13 DIAGNOSIS — N40.1 BENIGN PROSTATIC HYPERPLASIA WITH LOWER URINARY TRACT SYMPTOMS: ICD-10-CM

## 2023-01-24 ENCOUNTER — OUTPATIENT (OUTPATIENT)
Dept: OUTPATIENT SERVICES | Facility: HOSPITAL | Age: 63
LOS: 1 days | End: 2023-01-24
Payer: COMMERCIAL

## 2023-01-24 VITALS
SYSTOLIC BLOOD PRESSURE: 127 MMHG | OXYGEN SATURATION: 97 % | RESPIRATION RATE: 16 BRPM | HEIGHT: 68 IN | TEMPERATURE: 98 F | HEART RATE: 93 BPM | WEIGHT: 177.91 LBS | DIASTOLIC BLOOD PRESSURE: 75 MMHG

## 2023-01-24 DIAGNOSIS — Z86.79 PERSONAL HISTORY OF OTHER DISEASES OF THE CIRCULATORY SYSTEM: ICD-10-CM

## 2023-01-24 DIAGNOSIS — N40.1 BENIGN PROSTATIC HYPERPLASIA WITH LOWER URINARY TRACT SYMPTOMS: ICD-10-CM

## 2023-01-24 DIAGNOSIS — Z01.812 ENCOUNTER FOR PREPROCEDURAL LABORATORY EXAMINATION: ICD-10-CM

## 2023-01-24 DIAGNOSIS — I61.9 NONTRAUMATIC INTRACEREBRAL HEMORRHAGE, UNSPECIFIED: Chronic | ICD-10-CM

## 2023-01-24 LAB
ANION GAP SERPL CALC-SCNC: 11 MMOL/L — SIGNIFICANT CHANGE UP (ref 7–14)
BUN SERPL-MCNC: 19 MG/DL — SIGNIFICANT CHANGE UP (ref 7–23)
CALCIUM SERPL-MCNC: 9.5 MG/DL — SIGNIFICANT CHANGE UP (ref 8.4–10.5)
CHLORIDE SERPL-SCNC: 103 MMOL/L — SIGNIFICANT CHANGE UP (ref 98–107)
CO2 SERPL-SCNC: 26 MMOL/L — SIGNIFICANT CHANGE UP (ref 22–31)
CREAT SERPL-MCNC: 0.9 MG/DL — SIGNIFICANT CHANGE UP (ref 0.5–1.3)
EGFR: 98 ML/MIN/1.73M2 — SIGNIFICANT CHANGE UP
GLUCOSE SERPL-MCNC: 96 MG/DL — SIGNIFICANT CHANGE UP (ref 70–99)
HCT VFR BLD CALC: 43.1 % — SIGNIFICANT CHANGE UP (ref 39–50)
HGB BLD-MCNC: 14.2 G/DL — SIGNIFICANT CHANGE UP (ref 13–17)
MCHC RBC-ENTMCNC: 30.9 PG — SIGNIFICANT CHANGE UP (ref 27–34)
MCHC RBC-ENTMCNC: 32.9 GM/DL — SIGNIFICANT CHANGE UP (ref 32–36)
MCV RBC AUTO: 93.9 FL — SIGNIFICANT CHANGE UP (ref 80–100)
NRBC # BLD: 0 /100 WBCS — SIGNIFICANT CHANGE UP (ref 0–0)
NRBC # FLD: 0 K/UL — SIGNIFICANT CHANGE UP (ref 0–0)
PLATELET # BLD AUTO: 373 K/UL — SIGNIFICANT CHANGE UP (ref 150–400)
POTASSIUM SERPL-MCNC: 4.1 MMOL/L — SIGNIFICANT CHANGE UP (ref 3.5–5.3)
POTASSIUM SERPL-SCNC: 4.1 MMOL/L — SIGNIFICANT CHANGE UP (ref 3.5–5.3)
RBC # BLD: 4.59 M/UL — SIGNIFICANT CHANGE UP (ref 4.2–5.8)
RBC # FLD: 12.3 % — SIGNIFICANT CHANGE UP (ref 10.3–14.5)
SODIUM SERPL-SCNC: 140 MMOL/L — SIGNIFICANT CHANGE UP (ref 135–145)
WBC # BLD: 9.12 K/UL — SIGNIFICANT CHANGE UP (ref 3.8–10.5)
WBC # FLD AUTO: 9.12 K/UL — SIGNIFICANT CHANGE UP (ref 3.8–10.5)

## 2023-01-24 PROCEDURE — 93010 ELECTROCARDIOGRAM REPORT: CPT

## 2023-01-24 NOTE — H&P PST ADULT - PROBLEM SELECTOR PLAN 1
Written & verbal preop instructions, gi prophylaxis   Pt verbalized good understanding. Scheduled for bipolar transurethral resection of the prostate on 2/8/2023.  Written & verbal preop instructions, gi prophylaxis   Pt verbalized good understanding.

## 2023-01-24 NOTE — H&P PST ADULT - NEGATIVE GENERAL GENITOURINARY SYMPTOMS
weak flow/no hematuria/no renal colic/no flank pain L/no flank pain R no hematuria/no renal colic/no flank pain L/no flank pain R

## 2023-01-24 NOTE — H&P PST ADULT - MUSCULOSKELETAL
ROM intact/normal gait/strength 5/5 bilateral upper extremities/strength 5/5 bilateral lower extremities details… negative

## 2023-01-24 NOTE — H&P PST ADULT - NSICDXPASTMEDICALHX_GEN_ALL_CORE_FT
PAST MEDICAL HISTORY:  Cerebral brain hemorrhage     History of BPH      PAST MEDICAL HISTORY:  Cerebral brain hemorrhage     Enlarged prostate with lower urinary tract symptoms (LUTS)     History of BPH

## 2023-01-24 NOTE — H&P PST ADULT - NSICDXFAMILYHX_GEN_ALL_CORE_FT
FAMILY HISTORY:  Father  Still living? Unknown  FHx: prostate cancer, Age at diagnosis: Age Unknown    Grandparent  Still living? Unknown  FH: type 2 diabetes, Age at diagnosis: Age Unknown

## 2023-01-24 NOTE — H&P PST ADULT - HISTORY OF PRESENT ILLNESS
63y/o male presents for preop eval for scheduled bipolar transurethral resection of the prostate.  Pt with h/o enlarged prostate.  States had recent [facility] ER visit for urinary retention early this month.  Had naik catheter X approx four days.  C/o hesitancy and weak flow. 63y/o male presents for preop eval for scheduled bipolar transurethral resection of the prostate.  Pt with h/o enlarged prostate.  States had recent [facility] ER visit for urinary retention early this month.  Had naik catheter for approx four days and then d/c'ed.  Pt with c/o hesitancy and weak flow.

## 2023-01-24 NOTE — H&P PST ADULT - NS SC CAGE ALCOHOL CUT DOWN
Patient with h/o ESRD on HD; MWF schedule. Last HD was done on 6/25. Labs reviewed from today and acceptable. Patient had session of HD today and tolerated it well. Flow sheets reviewed; BP noted to be stable during HD. AVF working well with good blood flow.   Monitor BMP. no

## 2023-01-25 LAB
CULTURE RESULTS: SIGNIFICANT CHANGE UP
SPECIMEN SOURCE: SIGNIFICANT CHANGE UP

## 2023-01-30 PROBLEM — I61.9 NONTRAUMATIC INTRACEREBRAL HEMORRHAGE, UNSPECIFIED: Chronic | Status: ACTIVE | Noted: 2023-01-24

## 2023-01-30 PROBLEM — N40.1 BENIGN PROSTATIC HYPERPLASIA WITH LOWER URINARY TRACT SYMPTOMS: Chronic | Status: ACTIVE | Noted: 2023-01-24

## 2023-02-06 ENCOUNTER — NON-APPOINTMENT (OUTPATIENT)
Age: 63
End: 2023-02-06

## 2023-02-07 ENCOUNTER — TRANSCRIPTION ENCOUNTER (OUTPATIENT)
Age: 63
End: 2023-02-07

## 2023-02-07 NOTE — ASU PATIENT PROFILE, ADULT - NSICDXPASTMEDICALHX_GEN_ALL_CORE_FT
PAST MEDICAL HISTORY:  Cerebral brain hemorrhage     Enlarged prostate with lower urinary tract symptoms (LUTS)     History of BPH

## 2023-02-07 NOTE — ASU PATIENT PROFILE, ADULT - FALL HARM RISK - UNIVERSAL INTERVENTIONS
Bed in lowest position, wheels locked, appropriate side rails in place/Call bell, personal items and telephone in reach/Instruct patient to call for assistance before getting out of bed or chair/Non-slip footwear when patient is out of bed/[place] to call system/Physically safe environment - no spills, clutter or unnecessary equipment/Purposeful Proactive Rounding/Room/bathroom lighting operational, light cord in reach

## 2023-02-08 ENCOUNTER — RESULT REVIEW (OUTPATIENT)
Age: 63
End: 2023-02-08

## 2023-02-08 ENCOUNTER — TRANSCRIPTION ENCOUNTER (OUTPATIENT)
Age: 63
End: 2023-02-08

## 2023-02-08 ENCOUNTER — APPOINTMENT (OUTPATIENT)
Dept: UROLOGY | Facility: HOSPITAL | Age: 63
End: 2023-02-08

## 2023-02-08 ENCOUNTER — OUTPATIENT (OUTPATIENT)
Dept: OUTPATIENT SERVICES | Facility: HOSPITAL | Age: 63
LOS: 1 days | Discharge: HOME | End: 2023-02-08
Payer: COMMERCIAL

## 2023-02-08 VITALS
SYSTOLIC BLOOD PRESSURE: 113 MMHG | OXYGEN SATURATION: 100 % | DIASTOLIC BLOOD PRESSURE: 66 MMHG | WEIGHT: 177.91 LBS | RESPIRATION RATE: 14 BRPM | HEART RATE: 74 BPM | TEMPERATURE: 98 F | HEIGHT: 68 IN

## 2023-02-08 VITALS
HEART RATE: 84 BPM | DIASTOLIC BLOOD PRESSURE: 78 MMHG | RESPIRATION RATE: 20 BRPM | OXYGEN SATURATION: 100 % | SYSTOLIC BLOOD PRESSURE: 132 MMHG

## 2023-02-08 DIAGNOSIS — N40.1 BENIGN PROSTATIC HYPERPLASIA WITH LOWER URINARY TRACT SYMPTOMS: ICD-10-CM

## 2023-02-08 DIAGNOSIS — I61.9 NONTRAUMATIC INTRACEREBRAL HEMORRHAGE, UNSPECIFIED: Chronic | ICD-10-CM

## 2023-02-08 PROCEDURE — 52601 PROSTATECTOMY (TURP): CPT

## 2023-02-08 PROCEDURE — 88305 TISSUE EXAM BY PATHOLOGIST: CPT | Mod: 26

## 2023-02-08 RX ORDER — ACETAMINOPHEN 500 MG
1000 TABLET ORAL ONCE
Refills: 0 | Status: COMPLETED | OUTPATIENT
Start: 2023-02-08 | End: 2023-02-08

## 2023-02-08 RX ORDER — TAMSULOSIN HYDROCHLORIDE 0.4 MG/1
1 CAPSULE ORAL
Qty: 0 | Refills: 0 | DISCHARGE

## 2023-02-08 RX ORDER — FINASTERIDE 5 MG/1
1 TABLET, FILM COATED ORAL
Qty: 0 | Refills: 0 | DISCHARGE

## 2023-02-08 RX ORDER — KETOROLAC TROMETHAMINE 30 MG/ML
15 SYRINGE (ML) INJECTION ONCE
Refills: 0 | Status: DISCONTINUED | OUTPATIENT
Start: 2023-02-08 | End: 2023-02-08

## 2023-02-08 RX ORDER — LIDOCAINE HCL 20 MG/ML
10 VIAL (ML) INJECTION ONCE
Refills: 0 | Status: COMPLETED | OUTPATIENT
Start: 2023-02-08 | End: 2023-02-08

## 2023-02-08 RX ORDER — HYDROMORPHONE HYDROCHLORIDE 2 MG/ML
0.5 INJECTION INTRAMUSCULAR; INTRAVENOUS; SUBCUTANEOUS
Refills: 0 | Status: DISCONTINUED | OUTPATIENT
Start: 2023-02-08 | End: 2023-02-08

## 2023-02-08 RX ADMIN — HYDROMORPHONE HYDROCHLORIDE 0.5 MILLIGRAM(S): 2 INJECTION INTRAMUSCULAR; INTRAVENOUS; SUBCUTANEOUS at 18:00

## 2023-02-08 RX ADMIN — HYDROMORPHONE HYDROCHLORIDE 0.5 MILLIGRAM(S): 2 INJECTION INTRAMUSCULAR; INTRAVENOUS; SUBCUTANEOUS at 19:08

## 2023-02-08 RX ADMIN — HYDROMORPHONE HYDROCHLORIDE 0.5 MILLIGRAM(S): 2 INJECTION INTRAMUSCULAR; INTRAVENOUS; SUBCUTANEOUS at 20:15

## 2023-02-08 RX ADMIN — HYDROMORPHONE HYDROCHLORIDE 0.5 MILLIGRAM(S): 2 INJECTION INTRAMUSCULAR; INTRAVENOUS; SUBCUTANEOUS at 20:45

## 2023-02-08 RX ADMIN — Medication 400 MILLIGRAM(S): at 17:48

## 2023-02-08 RX ADMIN — Medication 1000 MILLIGRAM(S): at 19:08

## 2023-02-08 RX ADMIN — HYDROMORPHONE HYDROCHLORIDE 0.5 MILLIGRAM(S): 2 INJECTION INTRAMUSCULAR; INTRAVENOUS; SUBCUTANEOUS at 18:58

## 2023-02-08 RX ADMIN — Medication 10 MILLILITER(S): at 19:10

## 2023-02-08 NOTE — ASU DISCHARGE PLAN (ADULT/PEDIATRIC) - ASU DC SPECIAL INSTRUCTIONSFT
Call the office if you have fever greater than 101, difficulty urinating, pain not relieved with pain medication, nausea/vomiting.    Dr. Bower will call to schedule follow-up to have your catheter removed.

## 2023-02-08 NOTE — ASU DISCHARGE PLAN (ADULT/PEDIATRIC) - NS MD DC FALL RISK RISK
For information on Fall & Injury Prevention, visit: [email or web address] OR  [email or web address] OR  https://www.cdc.gov/steadi/patient.html

## 2023-02-09 ENCOUNTER — APPOINTMENT (OUTPATIENT)
Dept: UROLOGY | Facility: CLINIC | Age: 63
End: 2023-02-09

## 2023-02-09 ENCOUNTER — APPOINTMENT (OUTPATIENT)
Dept: UROLOGY | Facility: CLINIC | Age: 63
End: 2023-02-09
Payer: COMMERCIAL

## 2023-02-09 ENCOUNTER — NON-APPOINTMENT (OUTPATIENT)
Age: 63
End: 2023-02-09

## 2023-02-09 ENCOUNTER — OUTPATIENT (OUTPATIENT)
Dept: OUTPATIENT SERVICES | Facility: HOSPITAL | Age: 63
LOS: 1 days | End: 2023-02-09
Payer: COMMERCIAL

## 2023-02-09 VITALS — DIASTOLIC BLOOD PRESSURE: 71 MMHG | HEART RATE: 78 BPM | SYSTOLIC BLOOD PRESSURE: 112 MMHG

## 2023-02-09 DIAGNOSIS — I61.9 NONTRAUMATIC INTRACEREBRAL HEMORRHAGE, UNSPECIFIED: Chronic | ICD-10-CM

## 2023-02-09 DIAGNOSIS — N40.1 BENIGN PROSTATIC HYPERPLASIA WITH LOWER URINARY TRACT SYMPTOMS: ICD-10-CM

## 2023-02-09 DIAGNOSIS — R35.0 FREQUENCY OF MICTURITION: ICD-10-CM

## 2023-02-09 PROCEDURE — 51700 IRRIGATION OF BLADDER: CPT

## 2023-02-09 PROCEDURE — 51700 IRRIGATION OF BLADDER: CPT | Mod: 58

## 2023-02-09 PROCEDURE — 51798 US URINE CAPACITY MEASURE: CPT

## 2023-02-17 LAB — SURGICAL PATHOLOGY STUDY: SIGNIFICANT CHANGE UP

## 2023-02-20 NOTE — CONSULT LETTER
[Dear  ___] : Dear  [unfilled], [Courtesy Letter:] : I had the pleasure of seeing your patient, [unfilled], in my office today. 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  You may recall that the patient had a fall at home in the spring 2022 and ultimately developed an expanding subdural hematoma bilaterally which resulted in significant mass effect and required surgical evacuation in May 2022.  At our last visit cranial imaging showed good resolution of the brain compression and presence of subdural hematomas over the hemispheres.  However at the time the final report of the noncontrast scans was not available.  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  I have had the patient return to the office to better define this and discuss our approach to care.\par \par The patient denies any headaches.  He has returned to essentially normal activities though he does find some changes in his overall energy.  Once again he denies any problems with memory concentration or mood.\par \par I have gone over the patient's entire series of images.  I have explained that on some of the sequences no colloid cyst is seen because of the angle of the scan and the interval between slices.  However on the most recent one it is best identified as a small cystic lesion within the roof of the third ventricle associated with the choroid plexus without any concerns for obstruction of the rivera of Fried.  There is no evidence of hydrocephalus.\par \par I have reviewed in detail the natural history of a colloid cyst.  It is essentially trapped benign tissue from early fetal development.  I have explained that there is a small but important risk of potentially accumulated material within the cyst that can expand and on occasion cause ventricle obstruction.  I have reassured the patient that this is not the case currently.  It is therefore my recommendation that we would continue with interval imaging.  Only in the circumstance of documented progressive growth would be undertake procedure to remove this.  I have described in detail a minimally invasive endoscopic approach to care should that be necessary.  I will see the patient again in May 2023 at which point an updated MRI scan will be performed to better define and evaluate this incidental finding of a colloid cyst.\par \par Thank you for very kindly including me in the evaluation and ongoing support of your patient.  Please do not hesitate to contact me should you have any questions or concerns regarding this evaluation, the patient's past surgery for expanding subdural hematomas, or the current assessment of an incidental finding of a third ventricle colloid cyst. [FreeTextEntry2] : Neil Grubbs MD\par [address]\[place] [address] \par  [FreeTextEntry3] : Kory Morfin MD, PhD, FRCPSC \par Attending Neurosurgeon \par  of Neurosurgery \par [facility] \par [address] \[place] [address] \par Office: [phone] \par Fax: [phone]\par \par

## 2023-03-16 ENCOUNTER — APPOINTMENT (OUTPATIENT)
Dept: UROLOGY | Facility: CLINIC | Age: 63
End: 2023-03-16
Payer: COMMERCIAL

## 2023-03-16 VITALS
BODY MASS INDEX: 25.92 KG/M2 | HEIGHT: 69 IN | SYSTOLIC BLOOD PRESSURE: 101 MMHG | DIASTOLIC BLOOD PRESSURE: 68 MMHG | TEMPERATURE: 98 F | WEIGHT: 175 LBS | HEART RATE: 80 BPM

## 2023-03-16 PROCEDURE — 99024 POSTOP FOLLOW-UP VISIT: CPT

## 2023-03-16 RX ORDER — FINASTERIDE 5 MG/1
5 TABLET, FILM COATED ORAL
Qty: 90 | Refills: 3 | Status: DISCONTINUED | COMMUNITY
Start: 2022-02-07 | End: 2023-03-16

## 2023-03-17 NOTE — ASSESSMENT
[FreeTextEntry1] : I reviewed the surgical pathology with the patient today from the transurethral resection which showed all benign prostate tissue.  He has seen significant symptomatic improvement since his surgery and is very pleased with the results.  I advised that he return to the office in a few months for reassessment as well as to reestablish a new baseline PSA level post resection.  He communicates his understanding of the plan and is in agreement.

## 2023-03-17 NOTE — HISTORY OF PRESENT ILLNESS
[FreeTextEntry1] : Viet Calderón returns to the office today.  He is 63 years old and recently status post transurethral resection of the prostate.  The patient has had worsening BPH related symptoms for years including recently having episodes of urinary retention.  He is now back today for second postoperative visit.  He is feeling significantly improved after the procedure.  He has had good resolution of his previous difficulties with hesitancy and feelings of incomplete bladder emptying.  There is significantly reduced urgency and frequency to urinate as well as nocturia.  He is no longer using any prostate related medications at this time.  His post procedural hematuria has resolved.  He notes a good urinary flow.\par \par

## 2023-05-23 NOTE — ED PROVIDER NOTE - PATIENT PORTAL LINK FT
05/23/23 1613   OTHER   Discipline physical therapist   Rehab Time/Intention   Session Not Performed patient/family declined, not feeling well  (pt sitting up in chair, declined to attempt ambulation today, PT will follow up tomorrow.)   Recommendation   PT - Next Appointment 05/24/23        You can access the FollowMyHealth Patient Portal offered by [facility] by registering at the following website: [email or web address]. By joining [facility]’s FollowMyHealth portal, you will also be able to view your health information using other applications (apps) compatible with our system.

## 2023-08-11 NOTE — ED ADULT NURSE NOTE - SUICIDE SCREENING DEPRESSION
Patient called requesting refills on her Anoro inhaler to the [facility] on file. Please advise.
Negative

## 2023-10-05 ENCOUNTER — APPOINTMENT (OUTPATIENT)
Dept: UROLOGY | Facility: CLINIC | Age: 63
End: 2023-10-05

## 2023-10-05 DIAGNOSIS — N40.1 BENIGN PROSTATIC HYPERPLASIA WITH LOWER URINARY TRACT SYMPMS: ICD-10-CM

## 2023-10-05 DIAGNOSIS — N13.8 BENIGN PROSTATIC HYPERPLASIA WITH LOWER URINARY TRACT SYMPMS: ICD-10-CM

## 2023-12-13 NOTE — PHYSICAL THERAPY INITIAL EVALUATION ADULT - LEVEL OF CONSCIOUSNESS, REHAB EVAL
Hpi Title: Evaluation of Skin Lesions
How Severe Are Your Spot(S)?: mild
Have Your Spot(S) Been Treated In The Past?: has not been treated
alert

## 2024-05-06 ENCOUNTER — APPOINTMENT (OUTPATIENT)
Dept: NEUROSURGERY | Facility: CLINIC | Age: 64
End: 2024-05-06
Payer: COMMERCIAL

## 2024-05-06 VITALS
OXYGEN SATURATION: 97 % | DIASTOLIC BLOOD PRESSURE: 62 MMHG | WEIGHT: 173 LBS | HEART RATE: 86 BPM | BODY MASS INDEX: 25.62 KG/M2 | SYSTOLIC BLOOD PRESSURE: 119 MMHG | HEIGHT: 69 IN

## 2024-05-06 DIAGNOSIS — Z86.79 OTHER SPECIFIED POSTPROCEDURAL STATES: ICD-10-CM

## 2024-05-06 DIAGNOSIS — Z98.890 OTHER SPECIFIED POSTPROCEDURAL STATES: ICD-10-CM

## 2024-05-06 DIAGNOSIS — Q04.6 CONGENITAL CEREBRAL CYSTS: ICD-10-CM

## 2024-05-06 PROCEDURE — 99214 OFFICE O/P EST MOD 30 MIN: CPT

## 2024-05-06 NOTE — REASON FOR VISIT
[Follow-Up: _____] : a [unfilled] follow-up visit [Other: _____] : [unfilled] [FreeTextEntry1] : Sensation changes at site of incision

## 2024-05-06 NOTE — REVIEW OF SYSTEMS
[Confused or Disoriented] : no confusion [Memory Lapses or Loss] : no memory loss [Decr. Concentrating Ability] : no decrease in concentrating ability [Hand Weakness] : no hand weakness [Leg Weakness] : no leg weakness [Numbness] : numbness [Tingling] : tingling [Dizziness] : no dizziness [Cluster Headache] : no cluster headache [Migraine Headache] : no migraine headache [Tension Headache] : no tension-type headache [Difficulty Walking] : no difficulty walking [Negative] : Heme/Lymph

## 2024-05-06 NOTE — CONSULT LETTER
[Dear  ___] : Dear  [unfilled], [Courtesy Letter:] : I had the pleasure of seeing your patient, [unfilled], in my office today. [Sincerely,] : Sincerely, [FreeTextEntry2] : Neil Grubbs MD  [address]  [address]  [place] [address] [FreeTextEntry1] : This is a 64-year-old pleasant gentleman returning to our office with concerns of sensation changes on his incision site.  To recap, this patient had fallen at home in spring 2022 with LC and on CT imaging bilateral expanding subdural hematomas with mass effect was noted.  On May 4, 2022, the patient was taken to the OR for surgical evacuation of the hematoma and bilateral bur holes.  The patient had a good recovery and was followed in our office.  Incidentally a colloid cyst was noted on imaging.  Today the patient presents with numbness and tenderness at the incision site.  He denies any drainage or swelling.  At the incision site he feels tingling that is intermittent.  He has no radiating pain.  He has no headaches.  There is no tingling and numbness associated in any extremity.  No tinnitus.  No vision or gait disturbance.  The patient has no memory or cognitive issues and works full-time.    There are no new or recent images.  However I did review the most recent head CT from September 2022 which reveals a very small colloid cyst.  This colloid cyst has been present on prior images and appears stable.  On neurologic examination, the patient is alert and oriented.  Appears well and in no distress.  Speech clear and fluent.  CN intact.  No saccades and no nystagmus.  No ptosis.  Hearing normal.  The incision is clean and dry.  Skin intact.  No redness, swelling or drainage.  No vibration, temperature, or sensory loss throughout the incision site.  No pronator drift.  No dysmetria of UE.  Full strength in all muscle groups.  Gait is steady.  Able to walk on heels and toes without difficulty.  Negative straight leg testing bilaterally.  The patient is two years post jose hole and evacuation of hemorrhage with sensation changes of his incision site.  I explained that these symptoms are common and can occur.  I have ordered a Brain MRI to assess the stability of the colloid cyst and review for any morphology changes.  In addition, a CT scan of the head will be obtained to assess the titanium hardware and evaluate for hardware failure.   The patient will contact our office if there are any changes in his condition.  Once the MRI and CT scan is complete, he will return for image review with Dr. Morfin.  Thank you for very kindly including me in the evaluation and treatment of your patient.  Please do not hesitate to contact me should you have any concerns or questions regarding this evaluation or proposed follow-up treatment and evaluation plan.  [FreeTextEntry3] : Kimberly Lombardo, DNP, NP Nurse Practitioner Neurosurgery and Sp[facility] [place] Assistant  [employer] [facility] [employer] email: [email or web address]  [address] P- [phone] F- [phone]

## 2024-05-20 ENCOUNTER — APPOINTMENT (OUTPATIENT)
Dept: CT IMAGING | Facility: CLINIC | Age: 64
End: 2024-05-20
Payer: COMMERCIAL

## 2024-05-20 ENCOUNTER — APPOINTMENT (OUTPATIENT)
Dept: MRI IMAGING | Facility: CLINIC | Age: 64
End: 2024-05-20
Payer: COMMERCIAL

## 2024-05-20 ENCOUNTER — OUTPATIENT (OUTPATIENT)
Dept: OUTPATIENT SERVICES | Facility: HOSPITAL | Age: 64
LOS: 1 days | End: 2024-05-20
Payer: COMMERCIAL

## 2024-05-20 DIAGNOSIS — Q04.6 CONGENITAL CEREBRAL CYSTS: ICD-10-CM

## 2024-05-20 DIAGNOSIS — I61.9 NONTRAUMATIC INTRACEREBRAL HEMORRHAGE, UNSPECIFIED: Chronic | ICD-10-CM

## 2024-05-20 DIAGNOSIS — Z00.8 ENCOUNTER FOR OTHER GENERAL EXAMINATION: ICD-10-CM

## 2024-05-20 DIAGNOSIS — Z98.890 OTHER SPECIFIED POSTPROCEDURAL STATES: ICD-10-CM

## 2024-05-20 PROCEDURE — 70450 CT HEAD/BRAIN W/O DYE: CPT | Mod: 26

## 2024-05-20 PROCEDURE — 70450 CT HEAD/BRAIN W/O DYE: CPT

## 2024-05-20 PROCEDURE — 70551 MRI BRAIN STEM W/O DYE: CPT

## 2024-05-20 PROCEDURE — 70551 MRI BRAIN STEM W/O DYE: CPT | Mod: 26

## 2024-06-25 ENCOUNTER — NON-APPOINTMENT (OUTPATIENT)
Age: 64
End: 2024-06-25

## 2024-08-14 ENCOUNTER — APPOINTMENT (OUTPATIENT)
Dept: NEUROSURGERY | Facility: CLINIC | Age: 64
End: 2024-08-14

## 2024-08-14 VITALS
OXYGEN SATURATION: 100 % | HEART RATE: 61 BPM | WEIGHT: 170 LBS | DIASTOLIC BLOOD PRESSURE: 78 MMHG | BODY MASS INDEX: 25.18 KG/M2 | HEIGHT: 69 IN | SYSTOLIC BLOOD PRESSURE: 124 MMHG

## 2024-08-14 PROCEDURE — 99214 OFFICE O/P EST MOD 30 MIN: CPT

## 2024-08-16 NOTE — CONSULT LETTER
[Dear  ___] : Dear  [unfilled], [Courtesy Letter:] : I had the pleasure of seeing your patient, [unfilled], in my office today. [Sincerely,] : Sincerely, [FreeTextEntry2] : Neil Grubbs  [address] [place] [address]   [FreeTextEntry3] : Kory Morfin MD, PhD, FRCPSC   Attending Neurosurgeon   of Neurosurgery  [facility]  [address], [address]nd [place] [address]  Office: [phone]  Fax: [phone]

## 2024-08-16 NOTE — CONSULT LETTER
[Dear  ___] : Dear  [unfilled], [Courtesy Letter:] : I had the pleasure of seeing your patient, [unfilled], in my office today. [Sincerely,] : Sincerely, [FreeTextEntry2] : Neil Grubbs  [address] [place] [address]   [FreeTextEntry3] : Kory Morfin MD, PhD, FRCPSC   Attending Neurosurgeon   of Neurosurgery  [facility]  [address], [address]nd [place] [address]  Office: [phone]  Fax: [phone]

## 2025-01-07 NOTE — ASU PATIENT PROFILE, ADULT - PRO ARRIVE FROM
HPI   Chief Complaint   Patient presents with    Dizziness     Started yesterday @1000am when traveling home from [place]     Balance Problem    problems writing       Patient is a 83-year-old male with history of previous CVA with no residual deficits, previous MI presenting [facility] ED for dizziness, balance issues, episode of difficulty speaking earlier today.  Patient reports that this started around 12 PM.  Patient was at work, and felt a he could not write with his right hand, having episode of difficulty saying words.  Patient reports he also began having dizziness that started yesterday.      Patient history overall seems to be changing and he is a poor historian.  Initially reporting that all the symptoms began at 12 PM today however then began stating that he was having symptomatology began at 10 AM yesterday.  This is after the initial brain  attack activation was initiated by nursing staff and resident in triage.              Patient History   Past Medical History:   Diagnosis Date    Diabetes mellitus (Multi)     MI (myocardial infarction) (Multi)     Stroke (Multi)      Past Surgical History:   Procedure Laterality Date    CARDIAC CATHETERIZATION N/A 5/1/2024    Procedure: Left Heart Cath, No LV;  Surgeon: Maurice Salmon MD;  Location: [facility] Cardiac Cath Lab;  Service: Cardiovascular;  Laterality: N/A;    CARDIAC CATHETERIZATION N/A 5/1/2024    Procedure: PCI;  Surgeon: Maurice Salmon MD;  Location: [facility] Cardiac Cath Lab;  Service: Cardiovascular;  Laterality: N/A;     Family History   Problem Relation Name Age of Onset    Rheumatic fever Father      Diabetes type II Brother       Social History     Tobacco Use    Smoking status: Never    Smokeless tobacco: Never   Vaping Use    Vaping status: Never Used   Substance Use Topics    Alcohol use: Not Currently    Drug use: Never       Physical Exam   ED Triage Vitals [01/06/25 1311]   Temp Heart Rate Resp BP   36.5 °C (97.7 °F) 61 18 (!) 183/85       SpO2 Temp Source Heart Rate Source Patient Position   98 % Temporal Monitor Sitting      BP Location FiO2 (%)     Right arm --       Physical Exam  Constitutional:       Appearance: Normal appearance.   HENT:      Head: Normocephalic and atraumatic.      Nose: Nose normal.      Mouth/Throat:      Mouth: Mucous membranes are moist.      Pharynx: Oropharynx is clear.   Eyes:      Extraocular Movements: Extraocular movements intact.      Conjunctiva/sclera: Conjunctivae normal.      Pupils: Pupils are equal, round, and reactive to light.   Cardiovascular:      Rate and Rhythm: Normal rate and regular rhythm.      Pulses: Normal pulses.      Heart sounds: Normal heart sounds.   Pulmonary:      Effort: Pulmonary effort is normal.      Breath sounds: Normal breath sounds.   Abdominal:      General: Abdomen is flat. Bowel sounds are normal.      Palpations: Abdomen is soft.   Musculoskeletal:         General: Normal range of motion.      Cervical back: Normal range of motion and neck supple.   Skin:     General: Skin is warm and dry.      Capillary Refill: Capillary refill takes less than 2 seconds.   Neurological:      General: No focal deficit present.      Mental Status: He is alert and oriented to person, place, and time. Mental status is at baseline.      Comments: NIH of 1 for left nasolabial fold droop.  GCS 15.  Van negative.   Psychiatric:         Mood and Affect: Mood normal.         Behavior: Behavior normal.           ED Course & [facility]   ED Course as of 01/07/25 2113 Mon Jan 06, 2025   1318 Patient is a 83-year-old male with history of previous CVA with no residual deficits, previous MI presenting [facility] ED for dizziness, balance issues, episode of difficulty speaking earlier today.  Patient reports that this started around 12 PM.  Patient was at work, and felt a he could not write with his right hand, having episode of difficulty saying words. [MI]   1320 Physical exam.  NIH stroke scale of 0.  GCS of  15.  Slight bilateral nystagmus on EOM testing [MI]   1340 On-call radiology, confirming negative head CT. [MI]   1354 On-call neurology, Dr. Hernandez evaluating at bedside. [MI]   1355 NIH 1 at this time for left facial droop [TL]   1357 IMPRESSION:  1. Undulating attenuation of flow related enhancement in the V4  segment right vertebral artery with segments of nonenhancement  (possible occlusion) present. Thrombosis is suspected. No definite  enhancement of the right posteroinferior cerebellar artery.  2. Approximately 80 percent short-segment atherosclerotic stenosis  proximal right internal carotid artery.  3. Severe short-segment stenosis origin of the right vertebral  artery, suspected to be on the basis of atherosclerosis.       [TL]   1400 STAT MRI to determine if cerebellar infarct is present per neurology consultant. [TL]   1400 Per neurology consultation does not recommend thrombolysis at this time. [TL]   1401 VAN negative. Full dose aspirin to be given per neurology recommendation.  [TL]   1408 EKG reviewed with sinus bradycardia, left axis deviation.  No acute infarct. [TL]   1417 On further discussion with the patient he is now stating that his last known well was yesterday. [TL]   1532 Patient signed out to Dr. Alcala at this time.  [TL]      ED Course User Index  [MI] Africa Geller MD  [TL] Gonzales Burnham DO         Diagnoses as of 01/07/25 2113   Transient ischemic attack   Encephalopathy, unspecified type   ST elevation myocardial infarction (STEMI), unspecified artery (Multi)                 No data recorded     Deejay Coma Scale Score: 15 (01/06/25 1648 : Emily Acuña RN)       NIH Stroke Scale: 1 (01/06/25 1345 : Fer Bell, JANEE)                   Medical Decision Making  Patient is a 83 y.o. male who presents to [facility] ED for Dizziness (Started yesterday @1000am when traveling home from [place] ), Balance Problem, and problems writing. On initial ED evaluation, patient found  to be in no acute distress. Per HPI, concern to evaluate and treat for differential diagnosis including, but not limited to stroke, encephalopathy.  Obtaining stroke lab workup and diagnostics.  CT head negative for any acute hemorrhage or other abnormality.  CTA showed possible right vertebral artery occlusion, age-indeterminate.  On-call neurology, had evaluated patient at bedside, agreed with us that patient would not be a TNK candidate, as patient has no disabling, or persistent deficits.  Patient symptomatology more consistent with encephalopathic process at this time.  After discussion with consultant from the neurology service it was recommended we obtain MRI brain.  MRI showed no acute infarct, intracranial mass, or midline shift.  Neurology recommended inpatient admission for further stroke evaluation and management.  EKG showed no acute ischemic changes.  UA showed no acute signs of infection.  CMP showed no significant BRITTANY or electrolyte abnormality from patient baseline.  CBC showed no concerning anemia or leukocytosis.  Patient point-of-care glucose was 161, low concern for hypoglycemia at this time.    Diagnostic findings and treatment plan discussed with patient/family. They are amenable to plan. Patient admitted to medicine service for further evaluation and management.          Procedure  Critical Care    Performed by: Gonzales Burnham DO  Authorized by: Gonzales Burnham DO    Critical care provider statement:     Critical care time (minutes):  35    Critical care time was exclusive of:  Separately billable procedures and treating other patients and teaching time    Critical care was necessary to treat or prevent imminent or life-threatening deterioration of the following conditions:  CNS failure or compromise    Critical care was time spent personally by me on the following activities:  Ordering and performing treatments and interventions, ordering and review of laboratory studies, ordering and  review of radiographic studies, pulse oximetry, re-evaluation of patient's condition, review of old charts, obtaining history from patient or surrogate, evaluation of patient's response to treatment, development of treatment plan with patient or surrogate, discussions with consultants and examination of patient       Africa Geller MD  Resident  01/06/25 2048    I performed a history and physical examination of the patient and discussed his management with the resident physician.  I agree with the history, physical, assessment, and plan of care, with the following exceptions:   Patient not found to have disabling deficits and also found to have a negative brain MRI for acute stroke.  Based on this it was deemed that the patient was not a good candidate for thrombolysis after discussion with the patient and neurostroke consultant.  Patient signed out to the oncoming physician in the emergency department pending plan for admission for further evaluation and treatment..    I was present for key and critical portions of the following procedures: Critical care  Time Spent in Critical Care of the patient: 35  Time spent in discussions with the patient and family: 30    DO Gonzales Philip DO  01/07/25 0602     home

## 2025-06-29 NOTE — H&P PST ADULT - ATTENDING PHYSICIAN: I HAVE REVIEWED THE CLINICAL DOCUMENTATION AND AGREE WITH THE ABOVE NOTE
Goal Outcome Evaluation:  Plan of Care Reviewed With: patient        Progress: no change  Outcome Evaluation: Patients vitalls stable. patient denies any changes. Wll continue to monitor.         Problem: Adult Inpatient Plan of Care  Goal: Plan of Care Review  Outcome: Progressing  Flowsheets (Taken 6/29/2025 0506)  Progress: no change  Outcome Evaluation: Patients vitalls stable. patient denies any changes. Wll continue to monitor.  Plan of Care Reviewed With: patient     Problem: Comorbidity Management  Goal: Blood Pressure in Desired Range  Outcome: Progressing  Intervention: Maintain Blood Pressure Management  Recent Flowsheet Documentation  Taken 6/29/2025 0203 by Anahy Jeter RN  Medication Review/Management: medications reviewed  Taken 6/28/2025 2227 by Anahy Jeter RN  Medication Review/Management: medications reviewed  Taken 6/28/2025 2019 by Anahy Jeter RN  Medication Review/Management: medications reviewed     Problem: Infection  Goal: Absence of Infection Signs and Symptoms  Outcome: Progressing  Goal: Absence of Infection Signs and Symptoms  Outcome: Progressing     Problem: Fall Injury Risk  Goal: Absence of Fall and Fall-Related Injury  Outcome: Progressing  Intervention: Identify and Manage Contributors  Recent Flowsheet Documentation  Taken 6/29/2025 0203 by Anahy Jeter RN  Medication Review/Management: medications reviewed  Taken 6/28/2025 2227 by Anahy Jeter RN  Medication Review/Management: medications reviewed  Taken 6/28/2025 2019 by Anahy Jeter RN  Medication Review/Management: medications reviewed  Intervention: Promote Injury-Free Environment  Recent Flowsheet Documentation  Taken 6/29/2025 0424 by Anahy Jeter RN  Safety Promotion/Fall Prevention: safety round/check completed  Taken 6/29/2025 0203 by Anahy Jeter RN  Safety Promotion/Fall Prevention: safety round/check completed  Taken 6/29/2025 0038 by Anahy Jeter, RN  Safety Promotion/Fall  Prevention: safety round/check completed  Taken 6/28/2025 2227 by Anahy Jeter, RN  Safety Promotion/Fall Prevention: safety round/check completed  Taken 6/28/2025 2019 by Anahy Jeter, RN  Safety Promotion/Fall Prevention:   activity supervised   assistive device/personal items within reach   clutter free environment maintained   fall prevention program maintained   lighting adjusted   nonskid shoes/slippers when out of bed   safety round/check completed                          Statement Selected

## 2025-08-18 ENCOUNTER — NON-APPOINTMENT (OUTPATIENT)
Age: 65
End: 2025-08-18

## 2025-08-18 ENCOUNTER — APPOINTMENT (OUTPATIENT)
Dept: NEUROSURGERY | Facility: CLINIC | Age: 65
End: 2025-08-18

## 2025-08-18 VITALS
RESPIRATION RATE: 16 BRPM | SYSTOLIC BLOOD PRESSURE: 114 MMHG | OXYGEN SATURATION: 97 % | WEIGHT: 167 LBS | HEART RATE: 67 BPM | DIASTOLIC BLOOD PRESSURE: 73 MMHG | BODY MASS INDEX: 24.73 KG/M2 | HEIGHT: 69 IN

## 2025-09-19 ENCOUNTER — APPOINTMENT (OUTPATIENT)
Dept: MRI IMAGING | Facility: CLINIC | Age: 65
End: 2025-09-19
Payer: COMMERCIAL

## 2025-09-19 PROCEDURE — 70551 MRI BRAIN STEM W/O DYE: CPT | Mod: 26

## (undated) DEVICE — SOL IRR POUR H2O 1500ML

## (undated) DEVICE — GLV 7.5 PROTEXIS (CREAM) MICRO

## (undated) DEVICE — CABLE DAC ACTIVE CORD

## (undated) DEVICE — PACK CYSTO

## (undated) DEVICE — SOL IRR BAG H2O 3000ML

## (undated) DEVICE — ELCTR GROUNDING PAD ADULT COVIDIEN

## (undated) DEVICE — BAG URINE W METER 2L

## (undated) DEVICE — TUBING SET TUR BLADDER IRRIGATION Y-TYPE 81"

## (undated) DEVICE — GLV 8 PROTEXIS (CREAM) MICRO

## (undated) DEVICE — FOLEY CATH 3-WAY 22FR 30CC LATEX LUBRICATH

## (undated) DEVICE — WARMING BLANKET UPPER ADULT

## (undated) DEVICE — CANISTER DISPOSABLE THIN WALL 3000CC

## (undated) DEVICE — VENODYNE/SCD SLEEVE CALF MEDIUM

## (undated) DEVICE — SYR CATH TIP 2 OZ

## (undated) DEVICE — ELCTR PLASMA LOOP MEDIUM 24FR 12-30 DEG

## (undated) DEVICE — POSITIONER STRAP ARMBOARD VELCRO TS-30

## (undated) DEVICE — TUBING LEVEL ONE NORMOFLO SET